# Patient Record
Sex: FEMALE | Race: WHITE | Employment: OTHER | URBAN - METROPOLITAN AREA
[De-identification: names, ages, dates, MRNs, and addresses within clinical notes are randomized per-mention and may not be internally consistent; named-entity substitution may affect disease eponyms.]

---

## 2017-01-25 PROBLEM — R01.1 MURMUR: Status: ACTIVE | Noted: 2017-01-25

## 2017-02-24 PROBLEM — I35.0 NONRHEUMATIC AORTIC VALVE STENOSIS: Status: ACTIVE | Noted: 2017-02-24

## 2017-02-24 PROBLEM — I34.0 NON-RHEUMATIC MITRAL REGURGITATION: Status: ACTIVE | Noted: 2017-02-24

## 2017-02-24 PROBLEM — I35.1 NONRHEUMATIC AORTIC VALVE INSUFFICIENCY: Status: ACTIVE | Noted: 2017-02-24

## 2017-02-24 PROBLEM — I36.1 NON-RHEUMATIC TRICUSPID VALVE INSUFFICIENCY: Status: ACTIVE | Noted: 2017-02-24

## 2017-02-24 PROBLEM — I27.20 PULMONARY HYPERTENSION (HCC): Status: ACTIVE | Noted: 2017-02-24

## 2017-02-24 PROBLEM — R93.1 ABNORMAL ECHOCARDIOGRAM: Status: ACTIVE | Noted: 2017-02-24

## 2017-02-24 PROBLEM — I10 ESSENTIAL HYPERTENSION: Status: ACTIVE | Noted: 2017-02-24

## 2017-02-24 PROBLEM — R01.1 MURMUR: Chronic | Status: ACTIVE | Noted: 2017-01-25

## 2018-02-27 PROBLEM — I38 VALVULAR HEART DISEASE: Status: ACTIVE | Noted: 2018-02-27

## 2018-04-24 PROBLEM — I65.23 CAROTID STENOSIS, BILATERAL: Status: ACTIVE | Noted: 2018-04-24

## 2018-04-24 PROBLEM — R01.1 MURMUR: Chronic | Status: RESOLVED | Noted: 2017-01-25 | Resolved: 2018-04-24

## 2023-08-16 ENCOUNTER — APPOINTMENT (OUTPATIENT)
Dept: GENERAL RADIOLOGY | Age: 88
DRG: 242 | End: 2023-08-16
Payer: MEDICARE

## 2023-08-16 ENCOUNTER — HOSPITAL ENCOUNTER (INPATIENT)
Age: 88
LOS: 5 days | Discharge: NURSING, REHAB OR LONG-TERM CARE | DRG: 242 | End: 2023-08-21
Attending: EMERGENCY MEDICINE | Admitting: HOSPITALIST
Payer: MEDICARE

## 2023-08-16 ENCOUNTER — APPOINTMENT (OUTPATIENT)
Age: 88
DRG: 242 | End: 2023-08-16
Attending: INTERNAL MEDICINE
Payer: MEDICARE

## 2023-08-16 DIAGNOSIS — I31.39 PERICARDIAL EFFUSION: ICD-10-CM

## 2023-08-16 DIAGNOSIS — R09.02 HYPOXIA: ICD-10-CM

## 2023-08-16 DIAGNOSIS — I44.2 COMPLETE HEART BLOCK (HCC): ICD-10-CM

## 2023-08-16 DIAGNOSIS — G89.4 CHRONIC PAIN SYNDROME: ICD-10-CM

## 2023-08-16 DIAGNOSIS — I44.2 THIRD DEGREE HEART BLOCK (HCC): Primary | ICD-10-CM

## 2023-08-16 PROBLEM — J18.9 PNEUMONIA OF RIGHT LOWER LOBE DUE TO INFECTIOUS ORGANISM: Status: ACTIVE | Noted: 2023-08-16

## 2023-08-16 PROBLEM — J95.811 POSTPROCEDURAL PNEUMOTHORAX: Status: ACTIVE | Noted: 2023-08-16

## 2023-08-16 LAB
ANION GAP SERPL CALCULATED.3IONS-SCNC: 9 MMOL/L (ref 9–17)
BASOPHILS # BLD: 0 K/UL (ref 0–0.2)
BASOPHILS NFR BLD: 0 % (ref 0–2)
BILIRUB UR QL STRIP: NEGATIVE
BUN SERPL-MCNC: 16 MG/DL (ref 8–23)
CALCIUM SERPL-MCNC: 9.3 MG/DL (ref 8.6–10.4)
CHLORIDE SERPL-SCNC: 102 MMOL/L (ref 98–107)
CLARITY UR: CLEAR
CO2 SERPL-SCNC: 28 MMOL/L (ref 20–31)
COLOR UR: YELLOW
COMMENT: ABNORMAL
CREAT SERPL-MCNC: 0.6 MG/DL (ref 0.5–0.9)
ECHO BSA: 1.45 M2
ECHO BSA: 1.45 M2
ECHO LV EF PHYSICIAN: 55 %
EOSINOPHIL # BLD: 0.1 K/UL (ref 0–0.4)
EOSINOPHILS RELATIVE PERCENT: 1 % (ref 1–4)
ERYTHROCYTE [DISTWIDTH] IN BLOOD BY AUTOMATED COUNT: 14.6 % (ref 12.5–15.4)
GFR SERPL CREATININE-BSD FRML MDRD: >60 ML/MIN/1.73M2
GLUCOSE SERPL-MCNC: 117 MG/DL (ref 70–99)
GLUCOSE UR STRIP-MCNC: NEGATIVE MG/DL
HCT VFR BLD AUTO: 32.2 % (ref 36–46)
HGB BLD-MCNC: 10.6 G/DL (ref 12–16)
HGB UR QL STRIP.AUTO: NEGATIVE
KETONES UR STRIP-MCNC: ABNORMAL MG/DL
LEUKOCYTE ESTERASE UR QL STRIP: NEGATIVE
LYMPHOCYTES NFR BLD: 0.7 K/UL (ref 1–4.8)
LYMPHOCYTES RELATIVE PERCENT: 12 % (ref 24–44)
MCH RBC QN AUTO: 33.8 PG (ref 26–34)
MCHC RBC AUTO-ENTMCNC: 32.9 G/DL (ref 31–37)
MCV RBC AUTO: 102.7 FL (ref 80–100)
MONOCYTES NFR BLD: 0.5 K/UL (ref 0.1–1.2)
MONOCYTES NFR BLD: 9 % (ref 2–11)
NEUTROPHILS NFR BLD: 78 % (ref 36–66)
NEUTS SEG NFR BLD: 4.4 K/UL (ref 1.8–7.7)
NITRITE UR QL STRIP: NEGATIVE
PH UR STRIP: 5.5 [PH] (ref 5–8)
PLATELET # BLD AUTO: 211 K/UL (ref 140–450)
PMV BLD AUTO: 7.6 FL (ref 6–12)
POTASSIUM SERPL-SCNC: 4.7 MMOL/L (ref 3.7–5.3)
PROT UR STRIP-MCNC: NEGATIVE MG/DL
RBC # BLD AUTO: 3.14 M/UL (ref 4–5.2)
SODIUM SERPL-SCNC: 139 MMOL/L (ref 135–144)
SP GR UR STRIP: 1.01 (ref 1–1.03)
TROPONIN I SERPL HS-MCNC: 22 NG/L (ref 0–14)
TROPONIN I SERPL HS-MCNC: 24 NG/L (ref 0–14)
UROBILINOGEN UR STRIP-ACNC: NORMAL EU/DL (ref 0–1)
WBC OTHER # BLD: 5.7 K/UL (ref 3.5–11)

## 2023-08-16 PROCEDURE — C1785 PMKR, DUAL, RATE-RESP: HCPCS | Performed by: INTERNAL MEDICINE

## 2023-08-16 PROCEDURE — 99152 MOD SED SAME PHYS/QHP 5/>YRS: CPT | Performed by: INTERNAL MEDICINE

## 2023-08-16 PROCEDURE — 2709999900 HC NON-CHARGEABLE SUPPLY: Performed by: INTERNAL MEDICINE

## 2023-08-16 PROCEDURE — 71045 X-RAY EXAM CHEST 1 VIEW: CPT

## 2023-08-16 PROCEDURE — 51798 US URINE CAPACITY MEASURE: CPT

## 2023-08-16 PROCEDURE — 99233 SBSQ HOSP IP/OBS HIGH 50: CPT | Performed by: INTERNAL MEDICINE

## 2023-08-16 PROCEDURE — 36415 COLL VENOUS BLD VENIPUNCTURE: CPT

## 2023-08-16 PROCEDURE — 93308 TTE F-UP OR LMTD: CPT | Performed by: INTERNAL MEDICINE

## 2023-08-16 PROCEDURE — 6360000002 HC RX W HCPCS: Performed by: INTERNAL MEDICINE

## 2023-08-16 PROCEDURE — 1210000000 HC MED SURG R&B

## 2023-08-16 PROCEDURE — 93308 TTE F-UP OR LMTD: CPT

## 2023-08-16 PROCEDURE — 33208 INSRT HEART PM ATRIAL & VENT: CPT | Performed by: INTERNAL MEDICINE

## 2023-08-16 PROCEDURE — 81003 URINALYSIS AUTO W/O SCOPE: CPT

## 2023-08-16 PROCEDURE — 80048 BASIC METABOLIC PNL TOTAL CA: CPT

## 2023-08-16 PROCEDURE — 0JH606Z INSERTION OF PACEMAKER, DUAL CHAMBER INTO CHEST SUBCUTANEOUS TISSUE AND FASCIA, OPEN APPROACH: ICD-10-PCS | Performed by: INTERNAL MEDICINE

## 2023-08-16 PROCEDURE — 99223 1ST HOSP IP/OBS HIGH 75: CPT | Performed by: INTERNAL MEDICINE

## 2023-08-16 PROCEDURE — 93005 ELECTROCARDIOGRAM TRACING: CPT | Performed by: EMERGENCY MEDICINE

## 2023-08-16 PROCEDURE — 85025 COMPLETE CBC W/AUTO DIFF WBC: CPT

## 2023-08-16 PROCEDURE — 94640 AIRWAY INHALATION TREATMENT: CPT

## 2023-08-16 PROCEDURE — 2580000003 HC RX 258: Performed by: INTERNAL MEDICINE

## 2023-08-16 PROCEDURE — 96374 THER/PROPH/DIAG INJ IV PUSH: CPT

## 2023-08-16 PROCEDURE — 99153 MOD SED SAME PHYS/QHP EA: CPT | Performed by: INTERNAL MEDICINE

## 2023-08-16 PROCEDURE — C1898 LEAD, PMKR, OTHER THAN TRANS: HCPCS | Performed by: INTERNAL MEDICINE

## 2023-08-16 PROCEDURE — 02H63JZ INSERTION OF PACEMAKER LEAD INTO RIGHT ATRIUM, PERCUTANEOUS APPROACH: ICD-10-PCS | Performed by: INTERNAL MEDICINE

## 2023-08-16 PROCEDURE — 02HK3JZ INSERTION OF PACEMAKER LEAD INTO RIGHT VENTRICLE, PERCUTANEOUS APPROACH: ICD-10-PCS | Performed by: INTERNAL MEDICINE

## 2023-08-16 PROCEDURE — 2500000003 HC RX 250 WO HCPCS: Performed by: INTERNAL MEDICINE

## 2023-08-16 PROCEDURE — 99285 EMERGENCY DEPT VISIT HI MDM: CPT

## 2023-08-16 PROCEDURE — 84484 ASSAY OF TROPONIN QUANT: CPT

## 2023-08-16 PROCEDURE — 6360000002 HC RX W HCPCS: Performed by: EMERGENCY MEDICINE

## 2023-08-16 PROCEDURE — 2580000003 HC RX 258: Performed by: HOSPITALIST

## 2023-08-16 PROCEDURE — 6360000002 HC RX W HCPCS

## 2023-08-16 DEVICE — LEAD PACE AD 6FR L58CM VENT SIL PLAT INSUL BPLR PLATINIZED 507658] MEDTRONIC CRM]: Type: IMPLANTABLE DEVICE | Status: FUNCTIONAL

## 2023-08-16 DEVICE — LEAD PACE 6FR L52CM RNG ELECTRD DIA2MM PLATINIZED HELIX SIL: Type: IMPLANTABLE DEVICE | Status: FUNCTIONAL

## 2023-08-16 DEVICE — IPG W1DR01 AZURE XT DR MRI USA
Type: IMPLANTABLE DEVICE | Status: FUNCTIONAL
Brand: AZURE™ XT DR MRI SURESCAN™

## 2023-08-16 RX ORDER — ALBUTEROL SULFATE 2.5 MG/3ML
2.5 SOLUTION RESPIRATORY (INHALATION) 3 TIMES DAILY PRN
Status: DISCONTINUED | OUTPATIENT
Start: 2023-08-16 | End: 2023-08-17

## 2023-08-16 RX ORDER — ACETAMINOPHEN 325 MG/1
650 TABLET ORAL EVERY 6 HOURS PRN
Status: DISCONTINUED | OUTPATIENT
Start: 2023-08-16 | End: 2023-08-21 | Stop reason: HOSPADM

## 2023-08-16 RX ORDER — LIDOCAINE HYDROCHLORIDE 10 MG/ML
INJECTION, SOLUTION EPIDURAL; INFILTRATION; INTRACAUDAL; PERINEURAL PRN
Status: DISCONTINUED | OUTPATIENT
Start: 2023-08-16 | End: 2023-08-16 | Stop reason: HOSPADM

## 2023-08-16 RX ORDER — ALBUTEROL SULFATE 2.5 MG/3ML
SOLUTION RESPIRATORY (INHALATION)
Status: COMPLETED
Start: 2023-08-16 | End: 2023-08-16

## 2023-08-16 RX ORDER — DOPAMINE HYDROCHLORIDE 160 MG/100ML
5 INJECTION, SOLUTION INTRAVENOUS CONTINUOUS
Status: DISCONTINUED | OUTPATIENT
Start: 2023-08-16 | End: 2023-08-16

## 2023-08-16 RX ORDER — GLUCAGON 1 MG/ML
1 KIT INJECTION ONCE
Status: DISCONTINUED | OUTPATIENT
Start: 2023-08-16 | End: 2023-08-21 | Stop reason: HOSPADM

## 2023-08-16 RX ORDER — ALPRAZOLAM 0.25 MG/1
0.25 TABLET ORAL NIGHTLY PRN
Status: DISCONTINUED | OUTPATIENT
Start: 2023-08-16 | End: 2023-08-21 | Stop reason: HOSPADM

## 2023-08-16 RX ORDER — DOPAMINE HYDROCHLORIDE 160 MG/100ML
INJECTION, SOLUTION INTRAVENOUS CONTINUOUS PRN
Status: COMPLETED | OUTPATIENT
Start: 2023-08-16 | End: 2023-08-16

## 2023-08-16 RX ORDER — HYDROCODONE BITARTRATE AND ACETAMINOPHEN 5; 325 MG/1; MG/1
1 TABLET ORAL EVERY 6 HOURS PRN
Status: DISCONTINUED | OUTPATIENT
Start: 2023-08-16 | End: 2023-08-21 | Stop reason: HOSPADM

## 2023-08-16 RX ORDER — SODIUM CHLORIDE 9 MG/ML
INJECTION, SOLUTION INTRAVENOUS PRN
Status: DISCONTINUED | OUTPATIENT
Start: 2023-08-16 | End: 2023-08-21 | Stop reason: HOSPADM

## 2023-08-16 RX ORDER — AMLODIPINE BESYLATE 10 MG/1
10 TABLET ORAL DAILY
COMMUNITY

## 2023-08-16 RX ORDER — SODIUM CHLORIDE 0.9 % (FLUSH) 0.9 %
5-40 SYRINGE (ML) INJECTION PRN
Status: DISCONTINUED | OUTPATIENT
Start: 2023-08-16 | End: 2023-08-21 | Stop reason: HOSPADM

## 2023-08-16 RX ORDER — BUDESONIDE AND FORMOTEROL FUMARATE DIHYDRATE 80; 4.5 UG/1; UG/1
2 AEROSOL RESPIRATORY (INHALATION)
Status: DISCONTINUED | OUTPATIENT
Start: 2023-08-16 | End: 2023-08-21 | Stop reason: HOSPADM

## 2023-08-16 RX ORDER — ACETAMINOPHEN 650 MG/1
650 SUPPOSITORY RECTAL EVERY 4 HOURS PRN
COMMUNITY

## 2023-08-16 RX ORDER — DOPAMINE HYDROCHLORIDE 160 MG/100ML
2.5 INJECTION, SOLUTION INTRAVENOUS CONTINUOUS
Status: DISCONTINUED | OUTPATIENT
Start: 2023-08-16 | End: 2023-08-16

## 2023-08-16 RX ORDER — ONDANSETRON 2 MG/ML
INJECTION INTRAMUSCULAR; INTRAVENOUS PRN
Status: DISCONTINUED | OUTPATIENT
Start: 2023-08-16 | End: 2023-08-16 | Stop reason: HOSPADM

## 2023-08-16 RX ORDER — MORPHINE SULFATE 2 MG/ML
1 INJECTION, SOLUTION INTRAMUSCULAR; INTRAVENOUS EVERY 4 HOURS PRN
Status: DISCONTINUED | OUTPATIENT
Start: 2023-08-16 | End: 2023-08-21 | Stop reason: HOSPADM

## 2023-08-16 RX ORDER — ACETAMINOPHEN 650 MG/1
650 SUPPOSITORY RECTAL EVERY 6 HOURS PRN
Status: DISCONTINUED | OUTPATIENT
Start: 2023-08-16 | End: 2023-08-21 | Stop reason: HOSPADM

## 2023-08-16 RX ORDER — SODIUM CHLORIDE 0.9 % (FLUSH) 0.9 %
5-40 SYRINGE (ML) INJECTION EVERY 12 HOURS SCHEDULED
Status: DISCONTINUED | OUTPATIENT
Start: 2023-08-16 | End: 2023-08-21 | Stop reason: HOSPADM

## 2023-08-16 RX ORDER — POLYETHYLENE GLYCOL 3350 17 G/17G
17 POWDER, FOR SOLUTION ORAL DAILY PRN
Status: DISCONTINUED | OUTPATIENT
Start: 2023-08-16 | End: 2023-08-21 | Stop reason: HOSPADM

## 2023-08-16 RX ORDER — ONDANSETRON 2 MG/ML
4 INJECTION INTRAMUSCULAR; INTRAVENOUS EVERY 6 HOURS PRN
Status: DISCONTINUED | OUTPATIENT
Start: 2023-08-16 | End: 2023-08-21 | Stop reason: HOSPADM

## 2023-08-16 RX ORDER — ONDANSETRON 4 MG/1
4 TABLET, ORALLY DISINTEGRATING ORAL EVERY 8 HOURS PRN
Status: DISCONTINUED | OUTPATIENT
Start: 2023-08-16 | End: 2023-08-21 | Stop reason: HOSPADM

## 2023-08-16 RX ORDER — ALBUTEROL SULFATE 2.5 MG/3ML
2.5 SOLUTION RESPIRATORY (INHALATION) ONCE
Status: COMPLETED | OUTPATIENT
Start: 2023-08-16 | End: 2023-08-16

## 2023-08-16 RX ORDER — ACETAMINOPHEN 325 MG/1
650 TABLET ORAL EVERY 4 HOURS PRN
Status: DISCONTINUED | OUTPATIENT
Start: 2023-08-16 | End: 2023-08-16 | Stop reason: SDUPTHER

## 2023-08-16 RX ORDER — MIDAZOLAM HYDROCHLORIDE 1 MG/ML
INJECTION INTRAMUSCULAR; INTRAVENOUS PRN
Status: DISCONTINUED | OUTPATIENT
Start: 2023-08-16 | End: 2023-08-16 | Stop reason: HOSPADM

## 2023-08-16 RX ORDER — PANTOPRAZOLE SODIUM 40 MG/1
40 TABLET, DELAYED RELEASE ORAL DAILY
Status: DISCONTINUED | OUTPATIENT
Start: 2023-08-16 | End: 2023-08-21 | Stop reason: HOSPADM

## 2023-08-16 RX ORDER — ENOXAPARIN SODIUM 100 MG/ML
30 INJECTION SUBCUTANEOUS DAILY
Status: DISCONTINUED | OUTPATIENT
Start: 2023-08-16 | End: 2023-08-18

## 2023-08-16 RX ADMIN — ALBUTEROL SULFATE 2.5 MG: 2.5 SOLUTION RESPIRATORY (INHALATION) at 15:53

## 2023-08-16 RX ADMIN — DOPAMINE HYDROCHLORIDE 2.5 MCG/KG/MIN: 160 INJECTION, SOLUTION INTRAVENOUS at 16:23

## 2023-08-16 RX ADMIN — SODIUM CHLORIDE, PRESERVATIVE FREE 10 ML: 5 INJECTION INTRAVENOUS at 23:36

## 2023-08-16 ASSESSMENT — PAIN - FUNCTIONAL ASSESSMENT: PAIN_FUNCTIONAL_ASSESSMENT: NONE - DENIES PAIN

## 2023-08-17 ENCOUNTER — APPOINTMENT (OUTPATIENT)
Dept: GENERAL RADIOLOGY | Age: 88
DRG: 242 | End: 2023-08-17
Payer: MEDICARE

## 2023-08-17 PROBLEM — Z95.0 S/P PLACEMENT OF CARDIAC PACEMAKER: Status: ACTIVE | Noted: 2023-08-17

## 2023-08-17 PROBLEM — J96.01 ACUTE RESPIRATORY FAILURE WITH HYPOXIA (HCC): Status: ACTIVE | Noted: 2023-08-17

## 2023-08-17 LAB
ANION GAP SERPL CALCULATED.3IONS-SCNC: 10 MMOL/L (ref 9–17)
BASOPHILS # BLD: 0 K/UL (ref 0–0.2)
BASOPHILS NFR BLD: 0 % (ref 0–2)
BUN SERPL-MCNC: 18 MG/DL (ref 8–23)
CALCIUM SERPL-MCNC: 8.9 MG/DL (ref 8.6–10.4)
CHLORIDE SERPL-SCNC: 104 MMOL/L (ref 98–107)
CO2 SERPL-SCNC: 28 MMOL/L (ref 20–31)
CREAT SERPL-MCNC: 0.5 MG/DL (ref 0.5–0.9)
EKG ATRIAL RATE: 32 BPM
EKG P AXIS: 73 DEGREES
EKG Q-T INTERVAL: 664 MS
EKG QRS DURATION: 74 MS
EKG QTC CALCULATION (BAZETT): 499 MS
EKG R AXIS: 67 DEGREES
EKG T AXIS: -90 DEGREES
EKG VENTRICULAR RATE: 34 BPM
EOSINOPHIL # BLD: 0 K/UL (ref 0–0.4)
EOSINOPHILS RELATIVE PERCENT: 0 % (ref 1–4)
ERYTHROCYTE [DISTWIDTH] IN BLOOD BY AUTOMATED COUNT: 14.5 % (ref 12.5–15.4)
GFR SERPL CREATININE-BSD FRML MDRD: >60 ML/MIN/1.73M2
GLUCOSE SERPL-MCNC: 118 MG/DL (ref 70–99)
HCT VFR BLD AUTO: 31.5 % (ref 36–46)
HGB BLD-MCNC: 10.3 G/DL (ref 12–16)
LYMPHOCYTES NFR BLD: 0.5 K/UL (ref 1–4.8)
LYMPHOCYTES RELATIVE PERCENT: 5 % (ref 24–44)
MCH RBC QN AUTO: 33.7 PG (ref 26–34)
MCHC RBC AUTO-ENTMCNC: 32.6 G/DL (ref 31–37)
MCV RBC AUTO: 103.4 FL (ref 80–100)
MONOCYTES NFR BLD: 1 K/UL (ref 0.1–1.2)
MONOCYTES NFR BLD: 10 % (ref 2–11)
NEUTROPHILS NFR BLD: 85 % (ref 36–66)
NEUTS SEG NFR BLD: 8.4 K/UL (ref 1.8–7.7)
PLATELET # BLD AUTO: 196 K/UL (ref 140–450)
PMV BLD AUTO: 8.1 FL (ref 6–12)
POTASSIUM SERPL-SCNC: 5.1 MMOL/L (ref 3.7–5.3)
RBC # BLD AUTO: 3.05 M/UL (ref 4–5.2)
SODIUM SERPL-SCNC: 142 MMOL/L (ref 135–144)
WBC OTHER # BLD: 9.9 K/UL (ref 3.5–11)

## 2023-08-17 PROCEDURE — 6360000002 HC RX W HCPCS: Performed by: HOSPITALIST

## 2023-08-17 PROCEDURE — 99233 SBSQ HOSP IP/OBS HIGH 50: CPT | Performed by: NURSE PRACTITIONER

## 2023-08-17 PROCEDURE — 1210000000 HC MED SURG R&B

## 2023-08-17 PROCEDURE — 2580000003 HC RX 258: Performed by: HOSPITALIST

## 2023-08-17 PROCEDURE — 71045 X-RAY EXAM CHEST 1 VIEW: CPT

## 2023-08-17 PROCEDURE — 94640 AIRWAY INHALATION TREATMENT: CPT

## 2023-08-17 PROCEDURE — 99231 SBSQ HOSP IP/OBS SF/LOW 25: CPT | Performed by: INTERNAL MEDICINE

## 2023-08-17 PROCEDURE — 6360000002 HC RX W HCPCS: Performed by: INTERNAL MEDICINE

## 2023-08-17 PROCEDURE — 6370000000 HC RX 637 (ALT 250 FOR IP): Performed by: HOSPITALIST

## 2023-08-17 PROCEDURE — 99232 SBSQ HOSP IP/OBS MODERATE 35: CPT | Performed by: FAMILY MEDICINE

## 2023-08-17 PROCEDURE — 94761 N-INVAS EAR/PLS OXIMETRY MLT: CPT

## 2023-08-17 PROCEDURE — 36415 COLL VENOUS BLD VENIPUNCTURE: CPT

## 2023-08-17 PROCEDURE — 85025 COMPLETE CBC W/AUTO DIFF WBC: CPT

## 2023-08-17 PROCEDURE — 80048 BASIC METABOLIC PNL TOTAL CA: CPT

## 2023-08-17 PROCEDURE — 6370000000 HC RX 637 (ALT 250 FOR IP): Performed by: INTERNAL MEDICINE

## 2023-08-17 RX ORDER — TIZANIDINE 2 MG/1
2 TABLET ORAL EVERY 6 HOURS PRN
Status: ON HOLD | COMMUNITY
End: 2023-08-19 | Stop reason: HOSPADM

## 2023-08-17 RX ORDER — ALBUTEROL SULFATE 2.5 MG/3ML
2.5 SOLUTION RESPIRATORY (INHALATION)
Status: DISCONTINUED | OUTPATIENT
Start: 2023-08-17 | End: 2023-08-21 | Stop reason: HOSPADM

## 2023-08-17 RX ORDER — MIRTAZAPINE 30 MG/1
30 TABLET, FILM COATED ORAL NIGHTLY
COMMUNITY

## 2023-08-17 RX ORDER — CLONIDINE HYDROCHLORIDE 0.1 MG/1
0.1 TABLET ORAL 2 TIMES DAILY
Status: ON HOLD | COMMUNITY
End: 2023-08-19 | Stop reason: HOSPADM

## 2023-08-17 RX ORDER — ALBUTEROL SULFATE 2.5 MG/3ML
2.5 SOLUTION RESPIRATORY (INHALATION) EVERY 6 HOURS PRN
Status: DISCONTINUED | OUTPATIENT
Start: 2023-08-17 | End: 2023-08-21 | Stop reason: HOSPADM

## 2023-08-17 RX ORDER — METOPROLOL SUCCINATE 25 MG/1
25 TABLET, EXTENDED RELEASE ORAL DAILY
Status: DISCONTINUED | OUTPATIENT
Start: 2023-08-17 | End: 2023-08-21 | Stop reason: HOSPADM

## 2023-08-17 RX ORDER — ASPIRIN 81 MG/1
81 TABLET, CHEWABLE ORAL DAILY
COMMUNITY

## 2023-08-17 RX ORDER — HYDROCODONE BITARTRATE AND ACETAMINOPHEN 5; 325 MG/1; MG/1
1 TABLET ORAL EVERY 8 HOURS PRN
Status: ON HOLD | COMMUNITY
End: 2023-08-21 | Stop reason: HOSPADM

## 2023-08-17 RX ORDER — IBUPROFEN 200 MG
200 TABLET ORAL EVERY 8 HOURS PRN
Status: ON HOLD | COMMUNITY
End: 2023-08-19 | Stop reason: HOSPADM

## 2023-08-17 RX ADMIN — PANTOPRAZOLE SODIUM 40 MG: 40 TABLET, DELAYED RELEASE ORAL at 08:10

## 2023-08-17 RX ADMIN — HYDROCODONE BITARTRATE AND ACETAMINOPHEN 1 TABLET: 5; 325 TABLET ORAL at 20:47

## 2023-08-17 RX ADMIN — ALBUTEROL SULFATE 2.5 MG: 2.5 SOLUTION RESPIRATORY (INHALATION) at 14:37

## 2023-08-17 RX ADMIN — HYDROCODONE BITARTRATE AND ACETAMINOPHEN 1 TABLET: 5; 325 TABLET ORAL at 10:09

## 2023-08-17 RX ADMIN — ENOXAPARIN SODIUM 30 MG: 30 INJECTION SUBCUTANEOUS at 08:04

## 2023-08-17 RX ADMIN — ALPRAZOLAM 0.25 MG: 0.25 TABLET ORAL at 20:47

## 2023-08-17 RX ADMIN — HYDROCODONE BITARTRATE AND ACETAMINOPHEN 1 TABLET: 5; 325 TABLET ORAL at 01:34

## 2023-08-17 RX ADMIN — SODIUM CHLORIDE, PRESERVATIVE FREE 10 ML: 5 INJECTION INTRAVENOUS at 20:49

## 2023-08-17 RX ADMIN — ALBUTEROL SULFATE 2.5 MG: 2.5 SOLUTION RESPIRATORY (INHALATION) at 20:02

## 2023-08-17 RX ADMIN — MORPHINE SULFATE 1 MG: 2 INJECTION, SOLUTION INTRAMUSCULAR; INTRAVENOUS at 12:59

## 2023-08-17 RX ADMIN — SODIUM CHLORIDE, PRESERVATIVE FREE 10 ML: 5 INJECTION INTRAVENOUS at 08:09

## 2023-08-17 ASSESSMENT — PAIN DESCRIPTION - LOCATION
LOCATION: SHOULDER
LOCATION: INCISION
LOCATION: SHOULDER
LOCATION: FOOT

## 2023-08-17 ASSESSMENT — PAIN SCALES - GENERAL
PAINLEVEL_OUTOF10: 10
PAINLEVEL_OUTOF10: 10
PAINLEVEL_OUTOF10: 0
PAINLEVEL_OUTOF10: 10
PAINLEVEL_OUTOF10: 6
PAINLEVEL_OUTOF10: 6

## 2023-08-17 ASSESSMENT — PAIN - FUNCTIONAL ASSESSMENT: PAIN_FUNCTIONAL_ASSESSMENT: ACTIVITIES ARE NOT PREVENTED

## 2023-08-17 ASSESSMENT — PAIN DESCRIPTION - DESCRIPTORS: DESCRIPTORS: SORE;STABBING

## 2023-08-17 ASSESSMENT — PAIN DESCRIPTION - ORIENTATION
ORIENTATION: LEFT
ORIENTATION: RIGHT

## 2023-08-17 NOTE — CONSULTS
abnormalities of mood, affect, memory, mentation, or behavior are noted        EKG:    Date: 08/16/23  Reading: No acute ischemia    LABS:    No results found for: CHOL, TRIG, HDL, LDLCHOLESTEROL, LDLCALC, LABVLDL, VLDL, CHOLHDLRATIO  Lab Results   Component Value Date    TROPHS 22 (H) 08/16/2023     Lab Results   Component Value Date/Time     08/16/2023 01:55 PM    K 4.7 08/16/2023 01:55 PM     08/16/2023 01:55 PM    CO2 28 08/16/2023 01:55 PM    BUN 16 08/16/2023 01:55 PM    CREATININE 0.6 08/16/2023 01:55 PM    GLUCOSE 117 08/16/2023 01:55 PM    CALCIUM 9.3 08/16/2023 01:55 PM    LABGLOM >60 08/16/2023 01:55 PM          Other active acute problems:  Patient Active Problem List   Diagnosis    Abnormal echocardiogram    Essential hypertension    Pulmonary hypertension ([address])    Non-rheumatic tricuspid valve insufficiency    Non-rheumatic mitral regurgitation    Nonrheumatic aortic valve insufficiency    Nonrheumatic aortic valve stenosis    Valvular heart disease    Carotid stenosis, bilateral    Complete heart block (HCC)       Cardiac Testing:  Last Echo:  08/16/23    TRANSTHORACIC ECHOCARDIOGRAM (TTE) LIMITED (CONTRAST/BUBBLE/3D PRN) 08/16/2023  8:23 PM (Final)    Interpretation Summary    Left Ventricle: Normal left ventricular systolic function with a visually estimated EF of 55 - 60%. Left ventricle size is normal. Normal wall thickness. Normal wall motion. Bedside Echo by Dr. Surya Mandel post Pacemaker, patient was hypoxic and SOB. No pericardial effusion or tamponade. Normal LVEF  RV and RA leads in place  Limited bedside echo post pacer    Signed by: Treva Leos DO on 8/16/2023  8:23 PM      Last Cath:  No results found for this or any previous visit. Last Stress:  No results found for this or any previous visit. IMPRESSION & Recommendations:      Complete heart block/ third degree heart block. Spoke to patient and daughter. Would like pacemaker.  Patient will rescind DNR at

## 2023-08-17 NOTE — CONSULTS
placed  Neurologic: Alert and oriented X 3, no obvious focal deficit      Imaging      Lab Review  CBC     Lab Results   Component Value Date/Time    WBC 5.7 08/16/2023 01:55 PM    RBC 3.14 08/16/2023 01:55 PM    HGB 10.6 08/16/2023 01:55 PM    HCT 32.2 08/16/2023 01:55 PM     08/16/2023 01:55 PM    .7 08/16/2023 01:55 PM    MCH 33.8 08/16/2023 01:55 PM    MCHC 32.9 08/16/2023 01:55 PM    RDW 14.6 08/16/2023 01:55 PM    LYMPHOPCT 12 08/16/2023 01:55 PM    MONOPCT 9 08/16/2023 01:55 PM    BASOPCT 0 08/16/2023 01:55 PM    MONOSABS 0.50 08/16/2023 01:55 PM    LYMPHSABS 0.70 08/16/2023 01:55 PM    EOSABS 0.10 08/16/2023 01:55 PM    BASOSABS 0.00 08/16/2023 01:55 PM       BMP   Lab Results   Component Value Date/Time     08/16/2023 01:55 PM    K 4.7 08/16/2023 01:55 PM     08/16/2023 01:55 PM    CO2 28 08/16/2023 01:55 PM    BUN 16 08/16/2023 01:55 PM    CREATININE 0.6 08/16/2023 01:55 PM    GLUCOSE 117 08/16/2023 01:55 PM    CALCIUM 9.3 08/16/2023 01:55 PM       LFTS  No results found for: ALKPHOS, ALT, AST, PROT, BILITOT, BILIDIR, IBILI, LABALBU    INR  No results for input(s): PROTIME, INR in the last 72 hours. APTT  No results for input(s): APTT in the last 72 hours. Lactic Acid  No results found for: LACTA     PRO-BNP   No results for input(s): PROBNP in the last 72 hours.         ABGs: No results found for: PHART, PO2ART, OEO5TPC    No results found for: IFIO2, MODE, SETTIDVOL, SETPEEP      Impression    Complete heart block  Small left-sided pneumothorax after pacemaker insertion  Acute on chronic hypoxemia on 2 L baseline  COPD-no PFTs  Quit smoking over 40 years ago  History of breast cancer status post left mastectomy  Secondary pulmonary hypertension  DNR CC    Plan:      Monitor pulse ox at this point, no need for pigtail catheter or chest tube  Oxygen keep saturations greater than 88  Follow-up chest x-ray tomorrow  Adequate pain control but watch for oversedation given her

## 2023-08-17 NOTE — H&P
[facility]  Office: [phone]  1826, DO, Leeanna Aleman, DO, Carla Bro, DO, Cristiana Lau, DO, Fany Hinton MD, Yair Pierson MD, Yisel Whelan MD, Lidya Allan MD,  Ruth Coyle MD, Sulma Alcantar MD, Roque Laguna, DO, Lico Dewitt MD,  Aiden Whaley MD, Ovi Valencia MD, Fabiola Ayers, DO, Francisco J Grubbs MD,  Aline Das, DO, Cathryn Browne MD, Spencer Villafana MD, Kristina Haynes MD, Devonte Marquez MD,  Dominique Walker MD, Meredith Corona MD, Elias Farley MD, Abigail Carrasco DO, Harman Barrow MD,  Negrito Wilson MD, Clara Martinez, CNP,  Darrin Rojas, CNP, Perfecto Martin, CNP, Chiqui Purcell, CNP,  Sapna Chavez, DNP, Hernandez Smith, CNP, Trent Christie, CNP, Arelis Weller, CNP, Salima Seaman, CNP, Shabana Carr, CNP, Uma Noriega PA-C, Lulu Handy, SAGAR, Leola Vasquez, JAMMIE, Natalia Walters    HISTORY AND PHYSICAL EXAMINATION            Date:   8/17/2023  Patient name:  Rosette Santos  Date of admission:  8/16/2023  1:23 PM  MRN:   [id number]  Account:  [de-identified]  YOB: 1932  PCP:    Kristina Morales MD  [facility]  Code Status:    DNR-CC    Chief Complaint:     Chief Complaint   Patient presents with    Urinary Retention     From [facility], has not voided since last night, normally takes LASIX but was not given today. Pt also report HTN, again has not had lasix like normal, chronic 2L NC       History Obtained From:     patient, electronic medical record    History of Present Illness:     Rosette Santos is a 80 y.o. Non- / non  female who presents with Urinary Retention (From [facility], has not voided since last night, normally takes LASIX but was not given today.  Pt also report HTN, again has not had lasix like normal, chronic 2L NC)   and is admitted to the hospital for the management of S/P

## 2023-08-17 NOTE — PLAN OF CARE
Problem: Respiratory - Adult  Goal: Achieves optimal ventilation and oxygenation  8/17/2023 1444 by Josy Cardenas RCP  Outcome: Progressing  Flowsheets (Taken 8/17/2023 1444)  Achieves optimal ventilation and oxygenation:   Assess for changes in respiratory status   Respiratory therapy support as indicated   Assess for changes in mentation and behavior   Assess and instruct to report shortness of breath or any respiratory difficulty

## 2023-08-18 ENCOUNTER — APPOINTMENT (OUTPATIENT)
Dept: GENERAL RADIOLOGY | Age: 88
DRG: 242 | End: 2023-08-18
Payer: MEDICARE

## 2023-08-18 LAB
ANION GAP SERPL CALCULATED.3IONS-SCNC: 7 MMOL/L (ref 9–17)
BASOPHILS # BLD: 0 K/UL (ref 0–0.2)
BASOPHILS NFR BLD: 0 % (ref 0–2)
BUN SERPL-MCNC: 12 MG/DL (ref 8–23)
CALCIUM SERPL-MCNC: 8.8 MG/DL (ref 8.6–10.4)
CHLORIDE SERPL-SCNC: 100 MMOL/L (ref 98–107)
CO2 SERPL-SCNC: 32 MMOL/L (ref 20–31)
CREAT SERPL-MCNC: 0.4 MG/DL (ref 0.5–0.9)
EOSINOPHIL # BLD: 0 K/UL (ref 0–0.4)
EOSINOPHILS RELATIVE PERCENT: 0 % (ref 1–4)
ERYTHROCYTE [DISTWIDTH] IN BLOOD BY AUTOMATED COUNT: 14.5 % (ref 12.5–15.4)
GFR SERPL CREATININE-BSD FRML MDRD: >60 ML/MIN/1.73M2
GLUCOSE SERPL-MCNC: 115 MG/DL (ref 70–99)
HCT VFR BLD AUTO: 29.1 % (ref 36–46)
HGB BLD-MCNC: 9.5 G/DL (ref 12–16)
LYMPHOCYTES NFR BLD: 0.6 K/UL (ref 1–4.8)
LYMPHOCYTES RELATIVE PERCENT: 7 % (ref 24–44)
MCH RBC QN AUTO: 33.9 PG (ref 26–34)
MCHC RBC AUTO-ENTMCNC: 32.7 G/DL (ref 31–37)
MCV RBC AUTO: 103.5 FL (ref 80–100)
MONOCYTES NFR BLD: 0.8 K/UL (ref 0.1–1.2)
MONOCYTES NFR BLD: 10 % (ref 2–11)
NEUTROPHILS NFR BLD: 83 % (ref 36–66)
NEUTS SEG NFR BLD: 6.7 K/UL (ref 1.8–7.7)
PLATELET # BLD AUTO: 173 K/UL (ref 140–450)
PMV BLD AUTO: 8 FL (ref 6–12)
POTASSIUM SERPL-SCNC: 4.6 MMOL/L (ref 3.7–5.3)
RBC # BLD AUTO: 2.81 M/UL (ref 4–5.2)
SODIUM SERPL-SCNC: 139 MMOL/L (ref 135–144)
WBC OTHER # BLD: 8.2 K/UL (ref 3.5–11)

## 2023-08-18 PROCEDURE — 6370000000 HC RX 637 (ALT 250 FOR IP): Performed by: HOSPITALIST

## 2023-08-18 PROCEDURE — 6370000000 HC RX 637 (ALT 250 FOR IP): Performed by: FAMILY MEDICINE

## 2023-08-18 PROCEDURE — 85025 COMPLETE CBC W/AUTO DIFF WBC: CPT

## 2023-08-18 PROCEDURE — 2580000003 HC RX 258: Performed by: HOSPITALIST

## 2023-08-18 PROCEDURE — 80048 BASIC METABOLIC PNL TOTAL CA: CPT

## 2023-08-18 PROCEDURE — 1210000000 HC MED SURG R&B

## 2023-08-18 PROCEDURE — 94640 AIRWAY INHALATION TREATMENT: CPT

## 2023-08-18 PROCEDURE — 36415 COLL VENOUS BLD VENIPUNCTURE: CPT

## 2023-08-18 PROCEDURE — 6370000000 HC RX 637 (ALT 250 FOR IP): Performed by: INTERNAL MEDICINE

## 2023-08-18 PROCEDURE — 97162 PT EVAL MOD COMPLEX 30 MIN: CPT

## 2023-08-18 PROCEDURE — 6370000000 HC RX 637 (ALT 250 FOR IP): Performed by: NURSE PRACTITIONER

## 2023-08-18 PROCEDURE — 6360000002 HC RX W HCPCS: Performed by: HOSPITALIST

## 2023-08-18 PROCEDURE — 94761 N-INVAS EAR/PLS OXIMETRY MLT: CPT

## 2023-08-18 PROCEDURE — 97535 SELF CARE MNGMENT TRAINING: CPT

## 2023-08-18 PROCEDURE — 97530 THERAPEUTIC ACTIVITIES: CPT

## 2023-08-18 PROCEDURE — 71045 X-RAY EXAM CHEST 1 VIEW: CPT

## 2023-08-18 PROCEDURE — 97166 OT EVAL MOD COMPLEX 45 MIN: CPT

## 2023-08-18 PROCEDURE — 99232 SBSQ HOSP IP/OBS MODERATE 35: CPT | Performed by: FAMILY MEDICINE

## 2023-08-18 PROCEDURE — 2700000000 HC OXYGEN THERAPY PER DAY

## 2023-08-18 PROCEDURE — 99233 SBSQ HOSP IP/OBS HIGH 50: CPT | Performed by: SURGERY

## 2023-08-18 RX ORDER — LEVOFLOXACIN 250 MG/1
250 TABLET ORAL DAILY
Status: DISCONTINUED | OUTPATIENT
Start: 2023-08-18 | End: 2023-08-21 | Stop reason: HOSPADM

## 2023-08-18 RX ORDER — ENOXAPARIN SODIUM 100 MG/ML
40 INJECTION SUBCUTANEOUS DAILY
Status: DISCONTINUED | OUTPATIENT
Start: 2023-08-19 | End: 2023-08-21 | Stop reason: HOSPADM

## 2023-08-18 RX ORDER — LACTULOSE 10 G/15ML
20 SOLUTION ORAL 3 TIMES DAILY
Status: DISCONTINUED | OUTPATIENT
Start: 2023-08-18 | End: 2023-08-20

## 2023-08-18 RX ADMIN — SODIUM CHLORIDE, PRESERVATIVE FREE 10 ML: 5 INJECTION INTRAVENOUS at 21:00

## 2023-08-18 RX ADMIN — BUDESONIDE AND FORMOTEROL FUMARATE DIHYDRATE 2 PUFF: 80; 4.5 AEROSOL RESPIRATORY (INHALATION) at 20:37

## 2023-08-18 RX ADMIN — HYDROCODONE BITARTRATE AND ACETAMINOPHEN 1 TABLET: 5; 325 TABLET ORAL at 11:02

## 2023-08-18 RX ADMIN — ALBUTEROL SULFATE 2.5 MG: 2.5 SOLUTION RESPIRATORY (INHALATION) at 20:37

## 2023-08-18 RX ADMIN — ALBUTEROL SULFATE 2.5 MG: 2.5 SOLUTION RESPIRATORY (INHALATION) at 08:08

## 2023-08-18 RX ADMIN — LACTULOSE 20 G: 20 SOLUTION ORAL at 13:30

## 2023-08-18 RX ADMIN — BUDESONIDE AND FORMOTEROL FUMARATE DIHYDRATE 2 PUFF: 80; 4.5 AEROSOL RESPIRATORY (INHALATION) at 08:08

## 2023-08-18 RX ADMIN — SODIUM CHLORIDE, PRESERVATIVE FREE 10 ML: 5 INJECTION INTRAVENOUS at 08:08

## 2023-08-18 RX ADMIN — PANTOPRAZOLE SODIUM 40 MG: 40 TABLET, DELAYED RELEASE ORAL at 08:08

## 2023-08-18 RX ADMIN — LEVOFLOXACIN 250 MG: 250 TABLET, FILM COATED ORAL at 18:21

## 2023-08-18 RX ADMIN — METOPROLOL SUCCINATE 25 MG: 25 TABLET, EXTENDED RELEASE ORAL at 08:08

## 2023-08-18 RX ADMIN — ACETAMINOPHEN 650 MG: 325 TABLET ORAL at 08:06

## 2023-08-18 ASSESSMENT — PAIN SCALES - GENERAL
PAINLEVEL_OUTOF10: 4
PAINLEVEL_OUTOF10: 4

## 2023-08-18 ASSESSMENT — ENCOUNTER SYMPTOMS
SHORTNESS OF BREATH: 1
CHEST TIGHTNESS: 0
DIARRHEA: 0
COUGH: 0
CONSTIPATION: 0
NAUSEA: 0
BLOOD IN STOOL: 0
WHEEZING: 0
ABDOMINAL PAIN: 0
VOMITING: 0

## 2023-08-18 ASSESSMENT — PAIN DESCRIPTION - LOCATION: LOCATION: HEAD

## 2023-08-18 NOTE — PLAN OF CARE
Problem: Respiratory - Adult  Goal: Achieves optimal ventilation and oxygenation  8/18/2023 0819 by Rosario Alberts RCP  Outcome: Progressing  Flowsheets (Taken 8/18/2023 0819)  Achieves optimal ventilation and oxygenation:   Assess for changes in respiratory status   Respiratory therapy support as indicated   Assess and instruct to report shortness of breath or any respiratory difficulty   Assess for changes in mentation and behavior

## 2023-08-18 NOTE — PLAN OF CARE
Problem: Discharge Planning  Goal: Discharge to home or other facility with appropriate resources  Outcome: Progressing  Flowsheets (Taken 8/17/2023 2000)  Discharge to home or other facility with appropriate resources:   Identify barriers to discharge with patient and caregiver   Arrange for needed discharge resources and transportation as appropriate   Identify discharge learning needs (meds, wound care, etc)   Refer to discharge planning if patient needs post-hospital services based on physician order or complex needs related to functional status, cognitive ability or social support system     Problem: Pain  Goal: Verbalizes/displays adequate comfort level or baseline comfort level  Outcome: Progressing     Problem: Safety - Adult  Goal: Free from fall injury  Outcome: Progressing     Problem: Respiratory - Adult  Goal: Achieves optimal ventilation and oxygenation  8/18/2023 0047 by Ambrocio Quintanilla RCP  Outcome: Progressing  Flowsheets (Taken 8/18/2023 0047)  Achieves optimal ventilation and oxygenation:   Assess for changes in respiratory status   Position to facilitate oxygenation and minimize respiratory effort   Oxygen supplementation based on oxygen saturation or arterial blood gases   Encourage broncho-pulmonary hygiene including cough, deep breathe, incentive spirometry   Assess and instruct to report shortness of breath or any respiratory difficulty   Respiratory therapy support as indicated     Problem: Cardiovascular - Adult  Goal: Maintains optimal cardiac output and hemodynamic stability  Outcome: Progressing  Goal: Absence of cardiac dysrhythmias or at baseline  Outcome: Progressing     Problem: Skin/Tissue Integrity  Goal: Absence of new skin breakdown  Description: 1. Monitor for areas of redness and/or skin breakdown  2. Assess vascular access sites hourly  3. Every 4-6 hours minimum:  Change oxygen saturation probe site  4.   Every 4-6 hours:  If on nasal continuous positive airway pressure,

## 2023-08-18 NOTE — PLAN OF CARE
Problem: Respiratory - Adult  Goal: Achieves optimal ventilation and oxygenation  8/18/2023 0047 by Nakia Garcia RCP  Outcome: Progressing  Flowsheets (Taken 8/18/2023 0047)  Achieves optimal ventilation and oxygenation:   Assess for changes in respiratory status   Position to facilitate oxygenation and minimize respiratory effort   Oxygen supplementation based on oxygen saturation or arterial blood gases   Encourage broncho-pulmonary hygiene including cough, deep breathe, incentive spirometry   Assess and instruct to report shortness of breath or any respiratory difficulty   Respiratory therapy support as indicated

## 2023-08-18 NOTE — CARE COORDINATION
Case Management Assessment  Initial Evaluation    Date/Time of Evaluation: 8/18/2023 3:40 PM  Assessment Completed by: Pratima Rojo RN    If patient is discharged prior to next notation, then this note serves as note for discharge by case management. Patient Name: Shirley Delgado                   YOB: 1932  Diagnosis: Complete heart block ([address]) [I44.2]  Third degree heart block ([address]) [I44.2]                   Date / Time: 8/16/2023  1:23 PM    Patient Admission Status: Inpatient   Readmission Risk (Low < 19, Mod (19-27), High > 27): Readmission Risk Score: 17.4    Current PCP: Kathy Belcher MD  PCP verified by CM? Chart Reviewed: Yes      History Provided by: Patient  Patient Orientation: Alert and Oriented    Patient Cognition: Alert    Hospitalization in the last 30 days (Readmission):  No    If yes, Readmission Assessment in CM Navigator will be completed. Advance Directives:      Code Status: DNR-CC   Patient's Primary Decision Maker is: Legal Next of Kin      Discharge Planning:    Patient lives with:   Type of Home: House  Primary Care Giver: Other (Comment) (assisted Living)  Patient Support Systems include: Children   Current Financial resources:    Current community resources:    Current services prior to admission: None            Current DME:              Type of Home Care services:  None    ADLS  Prior functional level: Assistance with the following:  Current functional level: Assistance with the following:    PT AM-PAC: 10 /24  OT AM-PAC: 14 /24    Family can provide assistance at DC: Would you like Case Management to discuss the discharge plan with any other family members/significant others, and if so, who?     Plans to Return to Present Housing: Unknown at present  Other Identified Issues/Barriers to RETURNING to current housing:   Potential Assistance needed at discharge:              Potential DME:    Patient expects to discharge to: [employer] for transportation

## 2023-08-19 LAB
HCT VFR BLD AUTO: 25.1 % (ref 36–46)
HGB BLD-MCNC: 8.3 G/DL (ref 12–16)
PROCALCITONIN SERPL-MCNC: 0.27 NG/ML

## 2023-08-19 PROCEDURE — 2580000003 HC RX 258: Performed by: HOSPITALIST

## 2023-08-19 PROCEDURE — 85014 HEMATOCRIT: CPT

## 2023-08-19 PROCEDURE — 6370000000 HC RX 637 (ALT 250 FOR IP): Performed by: HOSPITALIST

## 2023-08-19 PROCEDURE — 6360000002 HC RX W HCPCS: Performed by: HOSPITALIST

## 2023-08-19 PROCEDURE — 85018 HEMOGLOBIN: CPT

## 2023-08-19 PROCEDURE — 94640 AIRWAY INHALATION TREATMENT: CPT

## 2023-08-19 PROCEDURE — 36415 COLL VENOUS BLD VENIPUNCTURE: CPT

## 2023-08-19 PROCEDURE — 2580000003 HC RX 258: Performed by: FAMILY MEDICINE

## 2023-08-19 PROCEDURE — 6370000000 HC RX 637 (ALT 250 FOR IP): Performed by: NURSE PRACTITIONER

## 2023-08-19 PROCEDURE — 97110 THERAPEUTIC EXERCISES: CPT

## 2023-08-19 PROCEDURE — 84145 PROCALCITONIN (PCT): CPT

## 2023-08-19 PROCEDURE — 94761 N-INVAS EAR/PLS OXIMETRY MLT: CPT

## 2023-08-19 PROCEDURE — 6370000000 HC RX 637 (ALT 250 FOR IP): Performed by: INTERNAL MEDICINE

## 2023-08-19 PROCEDURE — 97530 THERAPEUTIC ACTIVITIES: CPT

## 2023-08-19 PROCEDURE — 2700000000 HC OXYGEN THERAPY PER DAY

## 2023-08-19 PROCEDURE — 99232 SBSQ HOSP IP/OBS MODERATE 35: CPT | Performed by: FAMILY MEDICINE

## 2023-08-19 PROCEDURE — 1210000000 HC MED SURG R&B

## 2023-08-19 PROCEDURE — 6360000002 HC RX W HCPCS: Performed by: FAMILY MEDICINE

## 2023-08-19 PROCEDURE — 6370000000 HC RX 637 (ALT 250 FOR IP): Performed by: FAMILY MEDICINE

## 2023-08-19 RX ORDER — LEVOFLOXACIN 250 MG/1
250 TABLET ORAL DAILY
Qty: 5 TABLET | Refills: 0 | DISCHARGE
Start: 2023-08-20 | End: 2023-08-25

## 2023-08-19 RX ADMIN — HYDROCODONE BITARTRATE AND ACETAMINOPHEN 1 TABLET: 5; 325 TABLET ORAL at 20:34

## 2023-08-19 RX ADMIN — ALBUTEROL SULFATE 2.5 MG: 2.5 SOLUTION RESPIRATORY (INHALATION) at 08:33

## 2023-08-19 RX ADMIN — ALBUTEROL SULFATE 2.5 MG: 2.5 SOLUTION RESPIRATORY (INHALATION) at 14:14

## 2023-08-19 RX ADMIN — METOPROLOL SUCCINATE 25 MG: 25 TABLET, EXTENDED RELEASE ORAL at 09:00

## 2023-08-19 RX ADMIN — PANTOPRAZOLE SODIUM 40 MG: 40 TABLET, DELAYED RELEASE ORAL at 08:59

## 2023-08-19 RX ADMIN — ALBUTEROL SULFATE 2.5 MG: 2.5 SOLUTION RESPIRATORY (INHALATION) at 20:31

## 2023-08-19 RX ADMIN — LEVOFLOXACIN 250 MG: 250 TABLET, FILM COATED ORAL at 09:00

## 2023-08-19 RX ADMIN — SODIUM CHLORIDE, PRESERVATIVE FREE 10 ML: 5 INJECTION INTRAVENOUS at 21:00

## 2023-08-19 RX ADMIN — BUDESONIDE AND FORMOTEROL FUMARATE DIHYDRATE 2 PUFF: 80; 4.5 AEROSOL RESPIRATORY (INHALATION) at 08:33

## 2023-08-19 RX ADMIN — ALPRAZOLAM 0.25 MG: 0.25 TABLET ORAL at 23:00

## 2023-08-19 RX ADMIN — BUDESONIDE AND FORMOTEROL FUMARATE DIHYDRATE 2 PUFF: 80; 4.5 AEROSOL RESPIRATORY (INHALATION) at 20:31

## 2023-08-19 RX ADMIN — IRON SUCROSE 100 MG: 20 INJECTION, SOLUTION INTRAVENOUS at 18:41

## 2023-08-19 ASSESSMENT — ENCOUNTER SYMPTOMS
VOMITING: 0
ABDOMINAL PAIN: 0
SHORTNESS OF BREATH: 1
CONSTIPATION: 0
DIARRHEA: 0
NAUSEA: 0
COUGH: 0
CHEST TIGHTNESS: 0
BLOOD IN STOOL: 0
WHEEZING: 0

## 2023-08-19 ASSESSMENT — PAIN SCALES - GENERAL
PAINLEVEL_OUTOF10: 3
PAINLEVEL_OUTOF10: 4

## 2023-08-19 ASSESSMENT — PAIN - FUNCTIONAL ASSESSMENT: PAIN_FUNCTIONAL_ASSESSMENT: ACTIVITIES ARE NOT PREVENTED

## 2023-08-19 ASSESSMENT — PAIN DESCRIPTION - LOCATION
LOCATION: GENERALIZED
LOCATION: GENERALIZED

## 2023-08-19 ASSESSMENT — PAIN DESCRIPTION - DESCRIPTORS: DESCRIPTORS: ACHING

## 2023-08-19 NOTE — PLAN OF CARE
Problem: Pain  Goal: Verbalizes/displays adequate comfort level or baseline comfort level  Outcome: Progressing  Flowsheets (Taken 8/18/2023 2004)  Verbalizes/displays adequate comfort level or baseline comfort level: Encourage patient to monitor pain and request assistance     Problem: Safety - Adult  Goal: Free from fall injury  Outcome: Progressing     Problem: Respiratory - Adult  Goal: Achieves optimal ventilation and oxygenation  Outcome: Progressing  Flowsheets (Taken 8/18/2023 2000)  Achieves optimal ventilation and oxygenation: Assess for changes in respiratory status     Problem: Cardiovascular - Adult  Goal: Maintains optimal cardiac output and hemodynamic stability  Outcome: Progressing  Flowsheets (Taken 8/18/2023 2000)  Maintains optimal cardiac output and hemodynamic stability: Monitor blood pressure and heart rate  Goal: Absence of cardiac dysrhythmias or at baseline  Outcome: Progressing  Flowsheets (Taken 8/18/2023 2000)  Absence of cardiac dysrhythmias or at baseline: Monitor cardiac rate and rhythm     Problem: Skin/Tissue Integrity  Goal: Absence of new skin breakdown  Description: 1. Monitor for areas of redness and/or skin breakdown  2. Assess vascular access sites hourly  3. Every 4-6 hours minimum:  Change oxygen saturation probe site  4. Every 4-6 hours:  If on nasal continuous positive airway pressure, respiratory therapy assess nares and determine need for appliance change or resting period.   Outcome: Progressing     Problem: ABCDS Injury Assessment  Goal: Absence of physical injury  Outcome: Progressing

## 2023-08-20 ENCOUNTER — APPOINTMENT (OUTPATIENT)
Dept: GENERAL RADIOLOGY | Age: 88
DRG: 242 | End: 2023-08-20
Payer: MEDICARE

## 2023-08-20 LAB
ANION GAP SERPL CALCULATED.3IONS-SCNC: 7 MMOL/L (ref 9–17)
BASOPHILS # BLD: 0 K/UL (ref 0–0.2)
BASOPHILS NFR BLD: 0 % (ref 0–2)
BUN SERPL-MCNC: 11 MG/DL (ref 8–23)
CALCIUM SERPL-MCNC: 8.6 MG/DL (ref 8.6–10.4)
CHLORIDE SERPL-SCNC: 100 MMOL/L (ref 98–107)
CO2 SERPL-SCNC: 31 MMOL/L (ref 20–31)
CREAT SERPL-MCNC: <0.4 MG/DL (ref 0.5–0.9)
EOSINOPHIL # BLD: 0 K/UL (ref 0–0.4)
EOSINOPHILS RELATIVE PERCENT: 0 % (ref 1–4)
ERYTHROCYTE [DISTWIDTH] IN BLOOD BY AUTOMATED COUNT: 14 % (ref 12.5–15.4)
GFR SERPL CREATININE-BSD FRML MDRD: ABNORMAL ML/MIN/1.73M2
GLUCOSE SERPL-MCNC: 111 MG/DL (ref 70–99)
HCT VFR BLD AUTO: 26.5 % (ref 36–46)
HGB BLD-MCNC: 8.7 G/DL (ref 12–16)
LYMPHOCYTES NFR BLD: 0.91 K/UL (ref 1–4.8)
LYMPHOCYTES RELATIVE PERCENT: 10 % (ref 24–44)
MCH RBC QN AUTO: 34.3 PG (ref 26–34)
MCHC RBC AUTO-ENTMCNC: 33 G/DL (ref 31–37)
MCV RBC AUTO: 104.1 FL (ref 80–100)
MONOCYTES NFR BLD: 0.82 K/UL (ref 0.1–0.8)
MONOCYTES NFR BLD: 9 % (ref 1–7)
MORPHOLOGY: NORMAL
NEUTROPHILS NFR BLD: 81 % (ref 36–66)
NEUTS SEG NFR BLD: 7.37 K/UL (ref 1.8–7.7)
PLATELET # BLD AUTO: 179 K/UL (ref 140–450)
PMV BLD AUTO: 8.2 FL (ref 6–12)
POTASSIUM SERPL-SCNC: 4.3 MMOL/L (ref 3.7–5.3)
RBC # BLD AUTO: 2.54 M/UL (ref 4–5.2)
SODIUM SERPL-SCNC: 138 MMOL/L (ref 135–144)
WBC OTHER # BLD: 9.1 K/UL (ref 3.5–11)

## 2023-08-20 PROCEDURE — 6360000002 HC RX W HCPCS: Performed by: HOSPITALIST

## 2023-08-20 PROCEDURE — 6370000000 HC RX 637 (ALT 250 FOR IP): Performed by: INTERNAL MEDICINE

## 2023-08-20 PROCEDURE — 94760 N-INVAS EAR/PLS OXIMETRY 1: CPT

## 2023-08-20 PROCEDURE — 94640 AIRWAY INHALATION TREATMENT: CPT

## 2023-08-20 PROCEDURE — 1210000000 HC MED SURG R&B

## 2023-08-20 PROCEDURE — 6370000000 HC RX 637 (ALT 250 FOR IP): Performed by: NURSE PRACTITIONER

## 2023-08-20 PROCEDURE — 6370000000 HC RX 637 (ALT 250 FOR IP): Performed by: HOSPITALIST

## 2023-08-20 PROCEDURE — 6370000000 HC RX 637 (ALT 250 FOR IP): Performed by: FAMILY MEDICINE

## 2023-08-20 PROCEDURE — 82607 VITAMIN B-12: CPT

## 2023-08-20 PROCEDURE — 36415 COLL VENOUS BLD VENIPUNCTURE: CPT

## 2023-08-20 PROCEDURE — 2580000003 HC RX 258: Performed by: HOSPITALIST

## 2023-08-20 PROCEDURE — 97530 THERAPEUTIC ACTIVITIES: CPT

## 2023-08-20 PROCEDURE — 85025 COMPLETE CBC W/AUTO DIFF WBC: CPT

## 2023-08-20 PROCEDURE — 99232 SBSQ HOSP IP/OBS MODERATE 35: CPT | Performed by: FAMILY MEDICINE

## 2023-08-20 PROCEDURE — 82746 ASSAY OF FOLIC ACID SERUM: CPT

## 2023-08-20 PROCEDURE — 2580000003 HC RX 258: Performed by: FAMILY MEDICINE

## 2023-08-20 PROCEDURE — 80048 BASIC METABOLIC PNL TOTAL CA: CPT

## 2023-08-20 PROCEDURE — 71045 X-RAY EXAM CHEST 1 VIEW: CPT

## 2023-08-20 PROCEDURE — 2700000000 HC OXYGEN THERAPY PER DAY

## 2023-08-20 RX ORDER — SODIUM CHLORIDE 9 MG/ML
INJECTION, SOLUTION INTRAVENOUS CONTINUOUS
Status: ACTIVE | OUTPATIENT
Start: 2023-08-20 | End: 2023-08-20

## 2023-08-20 RX ADMIN — ENOXAPARIN SODIUM 40 MG: 100 INJECTION SUBCUTANEOUS at 08:49

## 2023-08-20 RX ADMIN — SODIUM CHLORIDE: 9 INJECTION, SOLUTION INTRAVENOUS at 14:21

## 2023-08-20 RX ADMIN — METOPROLOL SUCCINATE 25 MG: 25 TABLET, EXTENDED RELEASE ORAL at 08:48

## 2023-08-20 RX ADMIN — PANTOPRAZOLE SODIUM 40 MG: 40 TABLET, DELAYED RELEASE ORAL at 08:48

## 2023-08-20 RX ADMIN — ALBUTEROL SULFATE 2.5 MG: 2.5 SOLUTION RESPIRATORY (INHALATION) at 14:10

## 2023-08-20 RX ADMIN — ALBUTEROL SULFATE 2.5 MG: 2.5 SOLUTION RESPIRATORY (INHALATION) at 20:27

## 2023-08-20 RX ADMIN — SODIUM CHLORIDE, PRESERVATIVE FREE 10 ML: 5 INJECTION INTRAVENOUS at 21:00

## 2023-08-20 RX ADMIN — ALPRAZOLAM 0.25 MG: 0.25 TABLET ORAL at 20:49

## 2023-08-20 RX ADMIN — HYDROCODONE BITARTRATE AND ACETAMINOPHEN 1 TABLET: 5; 325 TABLET ORAL at 17:30

## 2023-08-20 RX ADMIN — ALBUTEROL SULFATE 2.5 MG: 2.5 SOLUTION RESPIRATORY (INHALATION) at 08:09

## 2023-08-20 RX ADMIN — HYDROCODONE BITARTRATE AND ACETAMINOPHEN 1 TABLET: 5; 325 TABLET ORAL at 23:19

## 2023-08-20 RX ADMIN — SODIUM CHLORIDE, PRESERVATIVE FREE 10 ML: 5 INJECTION INTRAVENOUS at 08:49

## 2023-08-20 RX ADMIN — BUDESONIDE AND FORMOTEROL FUMARATE DIHYDRATE 2 PUFF: 80; 4.5 AEROSOL RESPIRATORY (INHALATION) at 08:09

## 2023-08-20 RX ADMIN — BUDESONIDE AND FORMOTEROL FUMARATE DIHYDRATE 2 PUFF: 80; 4.5 AEROSOL RESPIRATORY (INHALATION) at 20:38

## 2023-08-20 RX ADMIN — LEVOFLOXACIN 250 MG: 250 TABLET, FILM COATED ORAL at 08:48

## 2023-08-20 ASSESSMENT — ENCOUNTER SYMPTOMS
DIARRHEA: 0
WHEEZING: 0
ABDOMINAL PAIN: 0
CHEST TIGHTNESS: 0
BLOOD IN STOOL: 0
CONSTIPATION: 0
NAUSEA: 0
COUGH: 0
VOMITING: 0
SHORTNESS OF BREATH: 1

## 2023-08-20 ASSESSMENT — PAIN SCALES - GENERAL
PAINLEVEL_OUTOF10: 10
PAINLEVEL_OUTOF10: 5

## 2023-08-20 ASSESSMENT — PAIN DESCRIPTION - DESCRIPTORS: DESCRIPTORS: ACHING

## 2023-08-20 ASSESSMENT — PAIN DESCRIPTION - LOCATION: LOCATION: GENERALIZED

## 2023-08-20 ASSESSMENT — PAIN - FUNCTIONAL ASSESSMENT: PAIN_FUNCTIONAL_ASSESSMENT: ACTIVITIES ARE NOT PREVENTED

## 2023-08-20 ASSESSMENT — PAIN DESCRIPTION - PAIN TYPE: TYPE: CHRONIC PAIN

## 2023-08-20 NOTE — PLAN OF CARE
Problem: Skin/Tissue Integrity  Goal: Absence of new skin breakdown  Description: 1. Monitor for areas of redness and/or skin breakdown  2. Assess vascular access sites hourly  3. Every 4-6 hours minimum:  Change oxygen saturation probe site  4. Every 4-6 hours:  If on nasal continuous positive airway pressure, respiratory therapy assess nares and determine need for appliance change or resting period.   Outcome: Progressing     Problem: ABCDS Injury Assessment  Goal: Absence of physical injury  Outcome: Progressing     Problem: Cardiovascular - Adult  Goal: Maintains optimal cardiac output and hemodynamic stability  Outcome: Progressing  Flowsheets (Taken 8/19/2023 2000)  Maintains optimal cardiac output and hemodynamic stability: Monitor blood pressure and heart rate  Goal: Absence of cardiac dysrhythmias or at baseline  Outcome: Progressing  Flowsheets (Taken 8/19/2023 2000)  Absence of cardiac dysrhythmias or at baseline: Monitor cardiac rate and rhythm     Problem: Respiratory - Adult  Goal: Achieves optimal ventilation and oxygenation  Outcome: Progressing  Flowsheets (Taken 8/19/2023 2000)  Achieves optimal ventilation and oxygenation: Assess for changes in respiratory status     Problem: Safety - Adult  Goal: Free from fall injury  Outcome: Progressing     Problem: Pain  Goal: Verbalizes/displays adequate comfort level or baseline comfort level  Outcome: Progressing  Flowsheets (Taken 8/19/2023 1930)  Verbalizes/displays adequate comfort level or baseline comfort level: Encourage patient to monitor pain and request assistance

## 2023-08-21 VITALS
HEART RATE: 69 BPM | BODY MASS INDEX: 24.22 KG/M2 | WEIGHT: 131.61 LBS | DIASTOLIC BLOOD PRESSURE: 65 MMHG | OXYGEN SATURATION: 92 % | HEIGHT: 62 IN | RESPIRATION RATE: 20 BRPM | TEMPERATURE: 97.5 F | SYSTOLIC BLOOD PRESSURE: 122 MMHG

## 2023-08-21 LAB
FOLATE SERPL-MCNC: 12.7 NG/ML
VIT B12 SERPL-MCNC: 666 PG/ML (ref 232–1245)

## 2023-08-21 PROCEDURE — 6370000000 HC RX 637 (ALT 250 FOR IP): Performed by: FAMILY MEDICINE

## 2023-08-21 PROCEDURE — 94761 N-INVAS EAR/PLS OXIMETRY MLT: CPT

## 2023-08-21 PROCEDURE — 94640 AIRWAY INHALATION TREATMENT: CPT

## 2023-08-21 PROCEDURE — 6360000002 HC RX W HCPCS: Performed by: HOSPITALIST

## 2023-08-21 PROCEDURE — 97535 SELF CARE MNGMENT TRAINING: CPT

## 2023-08-21 PROCEDURE — 2580000003 HC RX 258: Performed by: HOSPITALIST

## 2023-08-21 PROCEDURE — 2700000000 HC OXYGEN THERAPY PER DAY

## 2023-08-21 PROCEDURE — 6370000000 HC RX 637 (ALT 250 FOR IP): Performed by: HOSPITALIST

## 2023-08-21 PROCEDURE — 6370000000 HC RX 637 (ALT 250 FOR IP): Performed by: NURSE PRACTITIONER

## 2023-08-21 PROCEDURE — 99238 HOSP IP/OBS DSCHRG MGMT 30/<: CPT | Performed by: STUDENT IN AN ORGANIZED HEALTH CARE EDUCATION/TRAINING PROGRAM

## 2023-08-21 RX ORDER — HYDROCODONE BITARTRATE AND ACETAMINOPHEN 5; 325 MG/1; MG/1
1 TABLET ORAL EVERY 4 HOURS PRN
Qty: 18 TABLET | Refills: 0 | Status: SHIPPED | OUTPATIENT
Start: 2023-08-21 | End: 2023-08-24

## 2023-08-21 RX ADMIN — METOPROLOL SUCCINATE 25 MG: 25 TABLET, EXTENDED RELEASE ORAL at 08:08

## 2023-08-21 RX ADMIN — ENOXAPARIN SODIUM 40 MG: 100 INJECTION SUBCUTANEOUS at 08:08

## 2023-08-21 RX ADMIN — ALBUTEROL SULFATE 2.5 MG: 2.5 SOLUTION RESPIRATORY (INHALATION) at 15:24

## 2023-08-21 RX ADMIN — PANTOPRAZOLE SODIUM 40 MG: 40 TABLET, DELAYED RELEASE ORAL at 08:08

## 2023-08-21 RX ADMIN — BUDESONIDE AND FORMOTEROL FUMARATE DIHYDRATE 2 PUFF: 80; 4.5 AEROSOL RESPIRATORY (INHALATION) at 08:34

## 2023-08-21 RX ADMIN — ALBUTEROL SULFATE 2.5 MG: 2.5 SOLUTION RESPIRATORY (INHALATION) at 08:34

## 2023-08-21 RX ADMIN — LEVOFLOXACIN 250 MG: 250 TABLET, FILM COATED ORAL at 08:08

## 2023-08-21 RX ADMIN — SODIUM CHLORIDE, PRESERVATIVE FREE 10 ML: 5 INJECTION INTRAVENOUS at 08:08

## 2023-08-21 NOTE — DISCHARGE SUMMARY
[facility]  Office: [phone]  Joni Ramirez DO, Wendy Granda DO, Jennifer Lau, DO, Ricky Gilman MD, Jose Cummings MD, Carroll Ramires MD, Marilee Elizondo MD,  Maritza Mackey MD, Gauri Palmer MD, Jaskaran Ag, DO, Phani Hutton MD,  Mora Steinberg, DO, Carlos A Swift MD, Rosalia Lord MD, Charlesetta Burkitt, DO, Hema Akins MD,  William Sandoval DO, Amy Deleon MD, Ewa Booth MD, Omari Kennedy MD, [address] [place] [address] MD Clementina,  Yong Bamberger, MD, Yi Gonsales MD, Jazz Farr MD, Leanne Haro DO, Lila Dowell MD,  Valeria Waddell MD, Aki Ness, CNP,  Vijaya Galindo, CNP, Mark Pierce, CNP, Teresa Hampton, CNP,  Stephanie Campbell, DNP, Dolly Lawrence, CNP, Carina Prieto, CNP, Lalo Poole, CNP, Ragena Rubinstein, CNP, Jermaine Bowden, CNP, Hiwot Lemos PA-C, Víctor Bowden, SAGAR, Wilian Randhawa, CNP, Gloria Ascencio, [address] [place] [address]    Discharge Summary     Patient ID: Megha Leblanc  :  1932   MRN: [id number]     ACCOUNT:  [de-identified]   Patient's PCP: Bhaskar Mishra MD  Admit Date: 2023   Discharge Date: 2023  Length of Stay: 5  Code Status:  DNR-CC  Admitting Physician: No admitting provider for patient encounter. Discharge Physician: Rosalia Lord MD     Active Discharge Diagnoses:     Hospital Problem Lists:  Principal Problem:    S/P placement of cardiac pacemaker  Active Problems:    Complete heart block ([address])    Third degree heart block (HCC)    Postprocedural pneumothorax    Hypoxia    Pneumonia of right lower lobe due to infectious organism    Acute respiratory failure with hypoxia ([address])  Resolved Problems:    * No resolved hospital problems.  *      Admission Condition:  poor     Discharged Condition: good    Hospital Stay:     Hospital Course:  Megha Leblanc is a 80 y.o. female with a past medical history of hypertension, COPD (on 3 L NC at baseline), CHF and anxiety who

## 2023-08-21 NOTE — PLAN OF CARE
Problem: Respiratory - Adult  Goal: Achieves optimal ventilation and oxygenation  8/21/2023 0055 by Joseph Toney RCP  Outcome: Progressing  Flowsheets (Taken 8/21/2023 0055)  Achieves optimal ventilation and oxygenation:   Assess for changes in respiratory status   Position to facilitate oxygenation and minimize respiratory effort   Oxygen supplementation based on oxygen saturation or arterial blood gases   Encourage broncho-pulmonary hygiene including cough, deep breathe, incentive spirometry   Assess and instruct to report shortness of breath or any respiratory difficulty   Respiratory therapy support as indicated

## 2023-08-21 NOTE — PLAN OF CARE
Problem: Respiratory - Adult  Goal: Achieves optimal ventilation and oxygenation  8/21/2023 0842 by Akil Marshall RCP  Outcome: Progressing  Flowsheets (Taken 8/21/2023 5321)  Achieves optimal ventilation and oxygenation:   Assess for changes in respiratory status   Respiratory therapy support as indicated   Oxygen supplementation based on oxygen saturation or arterial blood gases   Assess and instruct to report shortness of breath or any respiratory difficulty

## 2023-08-21 NOTE — CARE COORDINATION
Transitional Planning    [facility]. Spoke with Car[facility]- they can accept.      Transport ETA 36     Contacted pt's daughter to update      # for Report [phone]

## 2023-08-21 NOTE — PLAN OF CARE
Problem: Discharge Planning  Goal: Discharge to home or other facility with appropriate resources  Outcome: Progressing  Flowsheets (Taken 8/20/2023 2000)  Discharge to home or other facility with appropriate resources: Identify barriers to discharge with patient and caregiver     Problem: Pain  Goal: Verbalizes/displays adequate comfort level or baseline comfort level  Outcome: Progressing     Problem: Safety - Adult  Goal: Free from fall injury  Outcome: Progressing     Problem: Respiratory - Adult  Goal: Achieves optimal ventilation and oxygenation  8/21/2023 0145 by Aubree Crane RN  Outcome: Progressing  8/21/2023 0055 by Jose Boston RCP  Outcome: Progressing  Flowsheets  Taken 8/21/2023 0055 by Jose Boston RCP  Achieves optimal ventilation and oxygenation:   Assess for changes in respiratory status   Position to facilitate oxygenation and minimize respiratory effort   Oxygen supplementation based on oxygen saturation or arterial blood gases   Encourage broncho-pulmonary hygiene including cough, deep breathe, incentive spirometry   Assess and instruct to report shortness of breath or any respiratory difficulty   Respiratory therapy support as indicated  Taken 8/20/2023 2000 by Aubree Crane RN  Achieves optimal ventilation and oxygenation: Assess for changes in respiratory status  8/20/2023 1533 by Dominic Kamara RCP  Outcome: Progressing     Problem: Cardiovascular - Adult  Goal: Maintains optimal cardiac output and hemodynamic stability  Outcome: Progressing  Flowsheets (Taken 8/20/2023 2000)  Maintains optimal cardiac output and hemodynamic stability: Monitor blood pressure and heart rate  Goal: Absence of cardiac dysrhythmias or at baseline  Outcome: Progressing  Flowsheets (Taken 8/20/2023 2000)  Absence of cardiac dysrhythmias or at baseline: Monitor cardiac rate and rhythm     Problem: Skin/Tissue Integrity  Goal: Absence of new skin breakdown  Description: 1.   Monitor for areas of

## 2023-08-21 NOTE — PLAN OF CARE
Problem: Discharge Planning  Goal: Discharge to home or other facility with appropriate resources  8/21/2023 1641 by Gabby Gonzalez RN  Outcome: Completed  8/21/2023 1201 by Viridiana Denise RN  Outcome: Progressing     Problem: Pain  Goal: Verbalizes/displays adequate comfort level or baseline comfort level  8/21/2023 1641 by Gabby Gonzalez RN  Outcome: Completed  8/21/2023 1201 by Viridiana Denise RN  Outcome: Progressing     Problem: Safety - Adult  Goal: Free from fall injury  8/21/2023 1641 by Gabby Gonzalez RN  Outcome: Completed  8/21/2023 1201 by Viridiana Denise RN  Outcome: Progressing     Problem: Respiratory - Adult  Goal: Achieves optimal ventilation and oxygenation  8/21/2023 1641 by Gabby Gonzalez RN  Outcome: Completed  8/21/2023 1201 by Viridiana Denise RN  Outcome: Progressing  8/21/2023 0842 by Anabelle Ronquillo RCP  Outcome: Progressing  Flowsheets (Taken 8/21/2023 6122)  Achieves optimal ventilation and oxygenation:   Assess for changes in respiratory status   Respiratory therapy support as indicated   Oxygen supplementation based on oxygen saturation or arterial blood gases   Assess and instruct to report shortness of breath or any respiratory difficulty     Problem: Cardiovascular - Adult  Goal: Maintains optimal cardiac output and hemodynamic stability  8/21/2023 1641 by Gabby Gonzalez RN  Outcome: Completed  8/21/2023 1201 by Viridiana Denise RN  Outcome: Progressing  Goal: Absence of cardiac dysrhythmias or at baseline  8/21/2023 1641 by Gabby Gonzalez RN  Outcome: Completed  8/21/2023 1201 by Viridiana Denise RN  Outcome: Progressing     Problem: Skin/Tissue Integrity  Goal: Absence of new skin breakdown  Description: 1. Monitor for areas of redness and/or skin breakdown  2. Assess vascular access sites hourly  3. Every 4-6 hours minimum:  Change oxygen saturation probe site  4.   Every 4-6 hours:  If on nasal continuous positive airway pressure, respiratory therapy assess nares and determine need

## 2023-08-21 NOTE — DISCHARGE INSTR - COC
Continuity of Care Form    Patient Name: Todd Salvador   :  1932  MRN:  [id number]    Admit date:  2023  Discharge date:  23    Code Status Order: [facility]   Advance Directives:     Admitting Physician:  No admitting provider for patient encounter. PCP: Meagan Huff MD    Discharging Nurse: Maricel Unit/Room#: 387/103-28  Discharging Unit Phone Number: [phone]    Emergency Contact:   Extended Emergency Contact Information  [facility] [address] Phone: [phone]  Mobile Phone: [phone]  Relation: None  Secondary Emergency Contact: [address] [facility] [address]  Work Phone: [phone]  Mobile Phone: [phone]  Relation: Child    Past Surgical History:  Past Surgical History:   Procedure Laterality Date    CATARACT REMOVAL      EP DEVICE PROCEDURE Left 2023    Insert PPM dual performed by Mahin Torres DO at [facility] Cardiac Cath/IR    HYSTERECTOMY (CERVIX STATUS UNKNOWN)      MASTECTOMY Left     TOE SURGERY         Immunization History: There is no immunization history on file for this patient.     Active Problems:  Patient Active Problem List   Diagnosis Code    Abnormal echocardiogram R93.1    Essential hypertension I10    Pulmonary hypertension (HCC) I27.20    Non-rheumatic tricuspid valve insufficiency I36.1    Non-rheumatic mitral regurgitation I34.0    Nonrheumatic aortic valve insufficiency I35.1    Nonrheumatic aortic valve stenosis I35.0    Valvular heart disease I38    Carotid stenosis, bilateral I65.23    Complete heart block (HCC) I44.2    Third degree heart block (HCC) I44.2    Postprocedural pneumothorax J95.811    Hypoxia R09.02    Pneumonia of right lower lobe due to infectious organism J18.9    S/P placement of cardiac pacemaker Z95.0    Acute respiratory failure with hypoxia (HCC) J96.01       Isolation/Infection:   Isolation            No Isolation          Patient Infection Status

## 2023-08-21 NOTE — PLAN OF CARE
Problem: Discharge Planning  Goal: Discharge to home or other facility with appropriate resources  8/21/2023 1201 by Jarek Vera RN  Outcome: Progressing   Patient actively participates in ADLs and decision making regarding plan of care     Problem: Pain  Goal: Verbalizes/displays adequate comfort level or baseline comfort level  8/21/2023 1201 by Jarek Vera RN  Outcome: Progressing   No new signs/symptoms of pain noted, pain rating < 3 on scale 0-10, pain controlled with medication/ repositioning     Problem: Safety - Adult  Goal: Free from fall injury  8/21/2023 1201 by Jarek Vera RN  Outcome: Progressing   No falls/injuries this shift, bed in lowest position, breaks on, bed alarm on, call light within reach, side rails up X2     Problem: Respiratory - Adult  Goal: Achieves optimal ventilation and oxygenation  8/21/2023 1201 by Jarek Vera RN  Outcome: Progressing   Oxygen administered as needed. Pulse oximetry levels WNL. No cyanosis noted. Repositioned to encourage proper ventilation. Problem: Skin/Tissue Integrity  Goal: Absence of new skin breakdown  Description: 1. Monitor for areas of redness and/or skin breakdown  2. Assess vascular access sites hourly  3. Every 4-6 hours minimum:  Change oxygen saturation probe site  4. Every 4-6 hours:  If on nasal continuous positive airway pressure, respiratory therapy assess nares and determine need for appliance change or resting period.   8/21/2023 1201 by Jarek Vera RN  Outcome: Progressing   No new skin breakdown noted, no new signs/symptoms of infection, continue to monitor lab work including WBC, and medications administered per physicians orders

## 2024-05-30 ENCOUNTER — TELEPHONE (OUTPATIENT)
Dept: ADMINISTRATIVE | Age: 89
End: 2024-05-30

## 2024-05-30 NOTE — TELEPHONE ENCOUNTER
Patient would like for Dionna Loya to give her call before her appointment on 6/6/24 Thursday at 1:15 pm.  She would like to explain that she thinks she may have bone spurs in her right elbow, she have gotten three Cortizone shots and none of them have work so far.     Phone

## 2024-06-06 ENCOUNTER — OFFICE VISIT (OUTPATIENT)
Age: 89
End: 2024-06-06

## 2024-06-06 VITALS — BODY MASS INDEX: 24.11 KG/M2 | WEIGHT: 131 LBS | HEIGHT: 62 IN

## 2024-06-06 DIAGNOSIS — M19.021 OSTEOARTHRITIS OF RIGHT ELBOW, UNSPECIFIED OSTEOARTHRITIS TYPE: Primary | ICD-10-CM

## 2024-06-06 NOTE — PROGRESS NOTES
[facility]      [facility] PHYSICIANS [facility]  [facility]  [address]  [place] [address]  Dept: [phone]  Dept Fax: [phone]    Chief Compliant:  Chief Complaint   Patient presents with    Elbow Pain     Right elbow          History of Present Illness:  This is a pleasant 89 y.o. female who is here for evaluation of chronic right elbow pain. Symptoms have been presents for about 2-3 years. No known injury or fall. She has seen orthopedics in the past, diagnosed with arthritis. She has had 3 cortisone injections in the past with no relief of pain. States her pain is mainly with range of motion. No pain at rest. Pain and difficulty with ADLs. She is right hand dominant. She takes Ibuprofen daily as well as has tried multiple over the counter pain relieving creams with no relief.     Physical Exam: Right elbow: Skin intact. Mild swelling. No significant TTP about the elbow. The elbow has good range of motion, lacking 5-10 degrees of full extension. Elbow stable to varus and valgus stress. No pain with resisted wrist flexion and extension. No pain with resisted middle finger extension. Sensation intact.     Imaging: None obtained today. Reviewed previous xrays of the right elbow obtained on 8/15/23 which demonstrates significant degenerative changes about the elbow with joint space narrowing and osteophyte formation.       Assessment and Plan:    This is a pleasant 89 y.o. female who has right elbow osteoarthritis. She has failed conservative treatment with over the counter arthritis oral and topical medications as well as previous cortisone injections in the past, most recent injection was about 4 weeks ago which provided no relief. Unfortunately she does not have any significant options that would improve her pain. Recommend discontinuing Ibuprofen and starting on Celebrex 200 mg oral once daily.

## 2024-06-18 PROBLEM — T82.120A DISPLACEMENT OF ATRIAL PACEMAKER LEADS: Status: ACTIVE | Noted: 2024-06-18

## 2024-06-27 ENCOUNTER — TELEPHONE (OUTPATIENT)
Age: 89
End: 2024-06-27

## 2024-06-27 NOTE — TELEPHONE ENCOUNTER
Spoke with Camille. Patient does have limited options but it was suggested to continue with the Celebrex maybe add Tylenol, ice/heat and topical voltaren to see if this helps combat the pain. Patient reports that ROM seems to aggravate more than help. Camille will speak with the patient and will call for appointment if these interventions do not help

## 2024-10-06 ENCOUNTER — APPOINTMENT (OUTPATIENT)
Dept: GENERAL RADIOLOGY | Age: 89
End: 2024-10-06
Payer: MEDICARE

## 2024-10-06 ENCOUNTER — HOSPITAL ENCOUNTER (EMERGENCY)
Age: 89
Discharge: HOME | End: 2024-10-06
Attending: EMERGENCY MEDICINE
Payer: MEDICARE

## 2024-10-06 VITALS
SYSTOLIC BLOOD PRESSURE: 115 MMHG | TEMPERATURE: 98.8 F | WEIGHT: 119 LBS | OXYGEN SATURATION: 93 % | BODY MASS INDEX: 21.9 KG/M2 | HEART RATE: 73 BPM | DIASTOLIC BLOOD PRESSURE: 71 MMHG | RESPIRATION RATE: 16 BRPM | HEIGHT: 62 IN

## 2024-10-06 DIAGNOSIS — J44.1 COPD EXACERBATION (HCC): Primary | ICD-10-CM

## 2024-10-06 LAB
ALBUMIN SERPL-MCNC: 3.7 G/DL (ref 3.5–5.2)
ALBUMIN/GLOB SERPL: 1.5 {RATIO} (ref 1–2.5)
ALP SERPL-CCNC: 83 U/L (ref 35–104)
ALT SERPL-CCNC: 13 U/L (ref 5–33)
ANION GAP SERPL CALCULATED.3IONS-SCNC: 10 MMOL/L (ref 9–17)
AST SERPL-CCNC: 21 U/L
BASOPHILS # BLD: 0 K/UL (ref 0–0.2)
BASOPHILS NFR BLD: 0 % (ref 0–2)
BILIRUB SERPL-MCNC: 0.6 MG/DL (ref 0.3–1.2)
BUN SERPL-MCNC: 22 MG/DL (ref 8–23)
CALCIUM SERPL-MCNC: 8.7 MG/DL (ref 8.6–10.4)
CHLORIDE SERPL-SCNC: 101 MMOL/L (ref 98–107)
CO2 SERPL-SCNC: 27 MMOL/L (ref 20–31)
CREAT SERPL-MCNC: 0.7 MG/DL (ref 0.5–0.9)
EOSINOPHIL # BLD: 0.2 K/UL (ref 0–0.4)
EOSINOPHILS RELATIVE PERCENT: 2 % (ref 1–4)
ERYTHROCYTE [DISTWIDTH] IN BLOOD BY AUTOMATED COUNT: 14.8 % (ref 12.5–15.4)
GFR, ESTIMATED: 81 ML/MIN/1.73M2
GLUCOSE SERPL-MCNC: 106 MG/DL (ref 70–99)
HCT VFR BLD AUTO: 34.4 % (ref 36–46)
HGB BLD-MCNC: 11.3 G/DL (ref 12–16)
LYMPHOCYTES NFR BLD: 0.8 K/UL (ref 1–4.8)
LYMPHOCYTES RELATIVE PERCENT: 8 % (ref 24–44)
MCH RBC QN AUTO: 32.2 PG (ref 26–34)
MCHC RBC AUTO-ENTMCNC: 32.9 G/DL (ref 31–37)
MCV RBC AUTO: 98 FL (ref 80–100)
MONOCYTES NFR BLD: 0.8 K/UL (ref 0.1–1.2)
MONOCYTES NFR BLD: 9 % (ref 2–11)
NEUTROPHILS NFR BLD: 81 % (ref 36–66)
NEUTS SEG NFR BLD: 8 K/UL (ref 1.8–7.7)
PLATELET # BLD AUTO: 251 K/UL (ref 140–450)
PMV BLD AUTO: 7.1 FL (ref 6–12)
POTASSIUM SERPL-SCNC: 5.1 MMOL/L (ref 3.7–5.3)
PROT SERPL-MCNC: 6.2 G/DL (ref 6.4–8.3)
RBC # BLD AUTO: 3.51 M/UL (ref 4–5.2)
SODIUM SERPL-SCNC: 138 MMOL/L (ref 135–144)
WBC OTHER # BLD: 9.8 K/UL (ref 3.5–11)

## 2024-10-06 PROCEDURE — 2580000003 HC RX 258: Performed by: EMERGENCY MEDICINE

## 2024-10-06 PROCEDURE — 71045 X-RAY EXAM CHEST 1 VIEW: CPT

## 2024-10-06 PROCEDURE — 85025 COMPLETE CBC W/AUTO DIFF WBC: CPT

## 2024-10-06 PROCEDURE — 96374 THER/PROPH/DIAG INJ IV PUSH: CPT

## 2024-10-06 PROCEDURE — 99284 EMERGENCY DEPT VISIT MOD MDM: CPT

## 2024-10-06 PROCEDURE — 80053 COMPREHEN METABOLIC PANEL: CPT

## 2024-10-06 PROCEDURE — 6360000002 HC RX W HCPCS: Performed by: EMERGENCY MEDICINE

## 2024-10-06 PROCEDURE — 6370000000 HC RX 637 (ALT 250 FOR IP): Performed by: EMERGENCY MEDICINE

## 2024-10-06 PROCEDURE — 36415 COLL VENOUS BLD VENIPUNCTURE: CPT

## 2024-10-06 RX ORDER — AZITHROMYCIN 250 MG/1
500 TABLET, FILM COATED ORAL ONCE
Status: COMPLETED | OUTPATIENT
Start: 2024-10-06 | End: 2024-10-06

## 2024-10-06 RX ORDER — 0.9 % SODIUM CHLORIDE 0.9 %
500 INTRAVENOUS SOLUTION INTRAVENOUS ONCE
Status: COMPLETED | OUTPATIENT
Start: 2024-10-06 | End: 2024-10-06

## 2024-10-06 RX ORDER — DEXAMETHASONE SODIUM PHOSPHATE 10 MG/ML
10 INJECTION, SOLUTION INTRAMUSCULAR; INTRAVENOUS ONCE
Status: COMPLETED | OUTPATIENT
Start: 2024-10-06 | End: 2024-10-06

## 2024-10-06 RX ORDER — AZITHROMYCIN 250 MG/1
250 TABLET, FILM COATED ORAL DAILY
Qty: 4 TABLET | Refills: 0 | Status: SHIPPED | OUTPATIENT
Start: 2024-10-06 | End: 2024-10-10

## 2024-10-06 RX ORDER — DEXAMETHASONE 4 MG/1
8 TABLET ORAL
Qty: 6 TABLET | Refills: 0 | Status: SHIPPED | OUTPATIENT
Start: 2024-10-06 | End: 2024-10-09

## 2024-10-06 RX ADMIN — SODIUM CHLORIDE 500 ML: 9 INJECTION, SOLUTION INTRAVENOUS at 20:08

## 2024-10-06 RX ADMIN — DEXAMETHASONE SODIUM PHOSPHATE 10 MG: 10 INJECTION INTRAMUSCULAR; INTRAVENOUS at 20:08

## 2024-10-06 RX ADMIN — AZITHROMYCIN DIHYDRATE 500 MG: 250 TABLET, FILM COATED ORAL at 20:44

## 2024-10-06 ASSESSMENT — PAIN - FUNCTIONAL ASSESSMENT: PAIN_FUNCTIONAL_ASSESSMENT: NONE - DENIES PAIN

## 2024-10-06 NOTE — ED PROVIDER NOTES
for rash.   Neurological:  Negative for weakness.   All other systems reviewed and are negative.      Except asnoted above the remainder of the review of systems was reviewed and negative.       PAST MEDICAL HISTORY     Past Medical History:   Diagnosis Date    Aortic regurgitation     Aortic stenosis     Arthritis     Breast cancer (HCC)     Cardiac murmur     Carotid artery stenosis     GERD (gastroesophageal reflux disease)     Hypertension     Mitral regurgitation     Pulmonary hypertension (HCC)     Tricuspid regurgitation          SURGICAL HISTORY       Past Surgical History:   Procedure Laterality Date    CATARACT REMOVAL      EP DEVICE PROCEDURE Left 8/16/2023    Insert PPM dual performed by Dami Dodson DO at [facility] Cardiac Cath/IR    HYSTERECTOMY (CERVIX STATUS UNKNOWN)      MASTECTOMY Left     TOE SURGERY           CURRENT MEDICATIONS     Previous Medications    ACETAMINOPHEN (TYLENOL) 650 MG SUPPOSITORY    Place 0.5 suppositories rectally every 4 hours as needed for Fever    ALBUTEROL (PROVENTIL) (2.5 MG/3ML) 0.083% NEBULIZER SOLUTION    Take 3 mLs by nebulization 3 times daily as needed for Wheezing    ALPRAZOLAM (XANAX) 0.25 MG TABLET    Take 1 tablet by mouth nightly as needed for Sleep.    AMLODIPINE (NORVASC) 10 MG TABLET    Take 1 tablet by mouth daily    ASPIRIN 81 MG CHEWABLE TABLET    Take 1 tablet by mouth daily    ASPIRIN 81 MG EC TABLET    1 tablet    BENZOCAINE-MENTHOL (CEPACOL) 6-10 MG LOZG LOZENGE    Take 1 lozenge by mouth every 2 hours as needed for Sore Throat    BISACODYL (DULCOLAX) 10 MG SUPPOSITORY    Place 1 suppository rectally daily    BREO ELLIPTA 200-25 MCG/ACT AEPB INHALER        CALCIUM CARBONATE-VITAMIN D (CALCIUM-VITAMIN D) 500-200 MG-UNIT PER TABLET    Take 1 tablet by mouth 2 times daily (with meals)    CELECOXIB (CELEBREX) 200 MG CAPSULE    Take 1 capsule by mouth daily    CHOLECALCIFEROL (VITAMIN D3) 2000 UNITS CAPS    Take by mouth    CLONIDINE (CATAPRES)

## 2024-10-07 NOTE — DISCHARGE INSTRUCTIONS
You may need to turn oxygen up to 4 or even 5 when you have your long tubing on at home    Please make an appointment to follow up with your primary doctor and/or the specialist as we discussed. Take all medications as prescribed.  Return to ER if condition worsens or you develop any new/concerning symptoms as we discussed.

## 2024-11-08 ENCOUNTER — HOSPITAL ENCOUNTER (EMERGENCY)
Age: 89
Discharge: HOME | End: 2024-11-08
Attending: EMERGENCY MEDICINE
Payer: MEDICARE

## 2024-11-08 ENCOUNTER — APPOINTMENT (OUTPATIENT)
Dept: GENERAL RADIOLOGY | Age: 89
End: 2024-11-08
Payer: MEDICARE

## 2024-11-08 VITALS
HEART RATE: 74 BPM | BODY MASS INDEX: 22.45 KG/M2 | WEIGHT: 122 LBS | OXYGEN SATURATION: 90 % | HEIGHT: 62 IN | DIASTOLIC BLOOD PRESSURE: 64 MMHG | TEMPERATURE: 97.9 F | RESPIRATION RATE: 20 BRPM | SYSTOLIC BLOOD PRESSURE: 114 MMHG

## 2024-11-08 DIAGNOSIS — I50.9 ACUTE ON CHRONIC CONGESTIVE HEART FAILURE, UNSPECIFIED HEART FAILURE TYPE (HCC): Primary | ICD-10-CM

## 2024-11-08 LAB
ANION GAP SERPL CALCULATED.3IONS-SCNC: 13 MMOL/L (ref 9–17)
BASOPHILS # BLD: 0 K/UL (ref 0–0.2)
BASOPHILS NFR BLD: 0 % (ref 0–2)
BNP SERPL-MCNC: 2924 PG/ML
BUN SERPL-MCNC: 21 MG/DL (ref 8–23)
CALCIUM SERPL-MCNC: 9.3 MG/DL (ref 8.6–10.4)
CHLORIDE SERPL-SCNC: 100 MMOL/L (ref 98–107)
CO2 SERPL-SCNC: 28 MMOL/L (ref 20–31)
CREAT SERPL-MCNC: 0.9 MG/DL (ref 0.5–0.9)
EOSINOPHIL # BLD: 0 K/UL (ref 0–0.4)
EOSINOPHILS RELATIVE PERCENT: 0 % (ref 1–4)
ERYTHROCYTE [DISTWIDTH] IN BLOOD BY AUTOMATED COUNT: 14.9 % (ref 12.5–15.4)
GFR, ESTIMATED: 60 ML/MIN/1.73M2
GLUCOSE SERPL-MCNC: 166 MG/DL (ref 70–99)
HCT VFR BLD AUTO: 36.7 % (ref 36–46)
HGB BLD-MCNC: 12 G/DL (ref 12–16)
LYMPHOCYTES NFR BLD: 0.7 K/UL (ref 1–4.8)
LYMPHOCYTES RELATIVE PERCENT: 8 % (ref 24–44)
MAGNESIUM SERPL-MCNC: 2 MG/DL (ref 1.6–2.6)
MCH RBC QN AUTO: 32.5 PG (ref 26–34)
MCHC RBC AUTO-ENTMCNC: 32.8 G/DL (ref 31–37)
MCV RBC AUTO: 99.1 FL (ref 80–100)
MONOCYTES NFR BLD: 0.5 K/UL (ref 0.1–1.2)
MONOCYTES NFR BLD: 6 % (ref 2–11)
NEUTROPHILS NFR BLD: 86 % (ref 36–66)
NEUTS SEG NFR BLD: 7.4 K/UL (ref 1.8–7.7)
PLATELET # BLD AUTO: 267 K/UL (ref 140–450)
PMV BLD AUTO: 7.6 FL (ref 6–12)
POTASSIUM SERPL-SCNC: 4.7 MMOL/L (ref 3.7–5.3)
RBC # BLD AUTO: 3.7 M/UL (ref 4–5.2)
SODIUM SERPL-SCNC: 141 MMOL/L (ref 135–144)
TROPONIN I SERPL HS-MCNC: 28 NG/L (ref 0–14)
WBC OTHER # BLD: 8.6 K/UL (ref 3.5–11)

## 2024-11-08 PROCEDURE — 99285 EMERGENCY DEPT VISIT HI MDM: CPT | Performed by: EMERGENCY MEDICINE

## 2024-11-08 PROCEDURE — 83735 ASSAY OF MAGNESIUM: CPT

## 2024-11-08 PROCEDURE — 80048 BASIC METABOLIC PNL TOTAL CA: CPT

## 2024-11-08 PROCEDURE — 85025 COMPLETE CBC W/AUTO DIFF WBC: CPT

## 2024-11-08 PROCEDURE — 84484 ASSAY OF TROPONIN QUANT: CPT

## 2024-11-08 PROCEDURE — 71045 X-RAY EXAM CHEST 1 VIEW: CPT

## 2024-11-08 PROCEDURE — 83880 ASSAY OF NATRIURETIC PEPTIDE: CPT

## 2024-11-08 ASSESSMENT — PAIN - FUNCTIONAL ASSESSMENT: PAIN_FUNCTIONAL_ASSESSMENT: NONE - DENIES PAIN

## 2024-11-08 NOTE — DISCHARGE INSTRUCTIONS
Please continue taking your lasix as instructed. You may benefit from wearing compression stockings to help with your lower leg swelling in addition to the lasix.    PLEASE RETURN TO THE EMERGENCY DEPARTMENT IMMEDIATELY for worsening symptoms of shortness of breath, wheezing, change in the amount of sputum that you cough up or a change in the color of your sputum, using your inhaler more frequently or if your inhaler only lasts up to 2 hours, or if you develop any concerning symptoms such as: high fever not relieved by acetaminophen (Tylenol) and/or ibuprofen (Motrin / Advil), chills, shortness of breath, chest pain, feeling of your heart fluttering or racing, persistent nausea and/or vomiting, numbness, weakness or tingling in the arms or legs or change in color of the extremities, changes in mental status, persistent headache, blurry vision, unable to follow up with your physician, or other any other care or concern.

## 2024-11-08 NOTE — ED PROVIDER NOTES
[facility] EMERGENCY DEPARTMENT  Emergency Department Encounter  Mid Level Provider     Pt Name: Linda Stein  MRN: [id number]  Birthdate 7/25/1932  Date of evaluation: 11/8/24  PCP:  Solomon Moya MD    CHIEF COMPLAINT       Chief Complaint   Patient presents with    Shortness of Breath       HISTORY OF PRESENT ILLNESS  (Location/Symptom, Timing/Onset,Context/Setting, Quality, Duration, Modifying Factors, Severity.)      Linda Stein is a 89 y.o. female who presents with complaints of hypoxia and increased shortness of breath.  Patient has a history of COPD, CHF and  lung cancer.  She is chronically on 2 L/min nasal cannula oxygen however has had to increase it to 4 L/min today.  She was found to be hypoxic by her nursing staff reportedly 78% on the 2 L/min nasal cannula she took an albuterol treatment which did not improve her symptoms.  Because of this she was sent here for evaluation.  She does have bilateral lower extremity edema however she reports this is typical for her.  She denies any associated fevers or chills, nausea or vomiting associated chest pain with the shortness of breath.    PAST MEDICAL /SURGICAL / SOCIAL / FAMILY HISTORY      has a past medical history of Aortic regurgitation, Aortic stenosis, Arthritis, Breast cancer (HCC), Cardiac murmur, Carotid artery stenosis, GERD (gastroesophageal reflux disease), Hypertension, Mitral regurgitation, Pulmonary hypertension (HCC), and Tricuspid regurgitation.     has a past surgical history that includes Mastectomy (Left); Hysterectomy; Toe Surgery; Cataract removal; and ep device procedure (Left, 8/16/2023).    Social History     Socioeconomic History    Marital status:      Spouse name: Not on file    Number of children: Not on file    Years of education: Not on file    Highest education level: Not on file   Occupational History    Not on file   Tobacco Use    Smoking status: Former     Current packs/day: 0.00        Chest wall: No mass, deformity, tenderness, crepitus or edema. There is no dullness to percussion.   Abdominal:      General: Bowel sounds are normal.      Palpations: Abdomen is soft.   Musculoskeletal:      Cervical back: Normal range of motion and neck supple.   Lymphadenopathy:      Cervical: No cervical adenopathy.   Skin:     General: Skin is warm and dry.      Capillary Refill: Capillary refill takes 2 to 3 seconds.      Coloration: Skin is pale.   Neurological:      General: No focal deficit present.      Mental Status: She is alert and oriented to person, place, and time.   Psychiatric:         Mood and Affect: Mood normal.         Behavior: Behavior normal.         EMERGENCY DEPARTMENT COURSE AND MEDICAL DECISION MAKING     DIFFERENTIAL DIAGNOSIS    Upper airway causes: angioedema, anaphylaxis, infections or FB of the upper airway  Pulmonary causes: COPD/Asthma/Emphysema, Pneumonia, pneumothorax, pulmonary edema, pleural effusion or pulmonary embolism  Cardiac causes: Heart failure, ACS, cardiac arrhythmias  Toxins: Cyanide, Carbon monoxide, drugs, DKA  Neurologic causes: Guillain-Barré , stroke     ED COURSE:         Medical Decision Making:    This patient was seen and evaluated in the Emergency Department for complaints of hypoxia. History of present illness and ED course notes above detail the events of their care. After detailed history, physical examination, and work-up with metabolic studies that do not show any significant electrolyte disturbance, renal dysfunction, BNP elevated at 2924, chest x-ray that shows stable right-sided effusion.  I suspect their complaints are secondary to congestive heart failure exacerbation.  Patient is currently on oral Lasix and her primary care provider has attempted to increase it however she declined due to excessive urination.  Hospital admission was offered however declined patient has been without any evidence of hypoxia or respiratory distress here in the

## 2024-11-08 NOTE — ED PROVIDER NOTES
[facility] Emergency Department  [id number]  [facility] [address]  Phone: [phone]  Fax: [phone]      Attending Physician Attestation          CHIEF COMPLAINT       Chief Complaint   Patient presents with    Shortness of Breath       DIAGNOSTIC RESULTS     LABS:  Labs Reviewed   CBC WITH AUTO DIFFERENTIAL - Abnormal; Notable for the following components:       Result Value    RBC 3.70 (*)     Neutrophils % 86 (*)     Lymphocytes % 8 (*)     Eosinophils % 0 (*)     Lymphocytes Absolute 0.70 (*)     All other components within normal limits   BASIC METABOLIC PANEL   MAGNESIUM   TROPONIN   BRAIN NATRIURETIC PEPTIDE       All other labs were within normal range or not returned as of this dictation.    RADIOLOGY:  XR CHEST PORTABLE    (Results Pending)         EMERGENCY DEPARTMENT COURSE:   Vitals:    Vitals:    11/08/24 1435   BP: 114/64   Pulse: 74   Resp: 20   Temp: 97.9 °F (36.6 °C)   SpO2: 90%   Weight: 55.3 kg (122 lb)   Height: 1.575 m (5' 2\")     -------------------------  BP: 114/64, Temp: 97.9 °F (36.6 °C), Pulse: 74, Respirations: 20             PERTINENT ATTENDING PHYSICIAN COMMENTS:    I performed a history and physical examination of the patient and discussed management with the mid level provider. I reviewed the mid level provider's note and agree with the documented findings and plan of care. Any areas of disagreement are noted on the chart. I was personally present for the key portions of any procedures. I have documented in the chart those procedures where I was not present during the key portions. I have reviewed the emergency nurses triage note. I agree with the chief complaint, past medical history, past surgical history, allergies, medications, social and family history as documented unless otherwise noted below. Documentation of the HPI, Physical Exam and Medical Decision Making performed by mid level providers is based on my personal performance of the

## 2024-12-10 LAB
EKG ATRIAL RATE: 73 BPM
EKG Q-T INTERVAL: 482 MS
EKG QRS DURATION: 156 MS
EKG QTC CALCULATION (BAZETT): 520 MS
EKG R AXIS: -96 DEGREES
EKG T AXIS: 62 DEGREES
EKG VENTRICULAR RATE: 70 BPM

## (undated) DEVICE — INTRODUCER SHTH PACEMKR 7FRX13CM SFSHTH II

## (undated) DEVICE — SURGICAL PROCEDURE TRAY CRD CATH SVMMC

## (undated) DEVICE — IMMOBILIZER ARM  LG 23-1873